# Patient Record
Sex: MALE | Race: BLACK OR AFRICAN AMERICAN | Employment: FULL TIME | RURAL
[De-identification: names, ages, dates, MRNs, and addresses within clinical notes are randomized per-mention and may not be internally consistent; named-entity substitution may affect disease eponyms.]

---

## 2017-07-12 ENCOUNTER — OFFICE VISIT (OUTPATIENT)
Dept: INTERNAL MEDICINE CLINIC | Age: 47
End: 2017-07-12

## 2017-07-12 VITALS
DIASTOLIC BLOOD PRESSURE: 88 MMHG | HEART RATE: 71 BPM | BODY MASS INDEX: 28.91 KG/M2 | SYSTOLIC BLOOD PRESSURE: 120 MMHG | HEIGHT: 69 IN | TEMPERATURE: 97.6 F | WEIGHT: 195.2 LBS | RESPIRATION RATE: 17 BRPM | OXYGEN SATURATION: 99 %

## 2017-07-12 DIAGNOSIS — R73.03 PREDIABETES: ICD-10-CM

## 2017-07-12 DIAGNOSIS — E78.5 DYSLIPIDEMIA: ICD-10-CM

## 2017-07-12 DIAGNOSIS — I10 ESSENTIAL HYPERTENSION: Primary | ICD-10-CM

## 2017-07-12 RX ORDER — SERTRALINE HYDROCHLORIDE 50 MG/1
25 TABLET, FILM COATED ORAL DAILY
Qty: 90 TAB | Refills: 1 | COMMUNITY
Start: 2017-07-12 | End: 2018-04-10 | Stop reason: SDUPTHER

## 2017-07-12 NOTE — MR AVS SNAPSHOT
Visit Information Date & Time Provider Department Dept. Phone Encounter #  
 7/12/2017  9:15 AM Edgar Peck NP [facility] [phone] [id number] Upcoming Health Maintenance Date Due INFLUENZA AGE 9 TO ADULT 8/1/2017 DTaP/Tdap/Td series (2 - Td) 7/12/2027 Allergies as of 7/12/2017  Review Complete On: 11/21/2016 By: Edgar Peck NP No Known Allergies Current Immunizations  Never Reviewed No immunizations on file. Not reviewed this visit You Were Diagnosed With   
  
 Codes Comments Essential hypertension    -  Primary ICD-10-CM: I10 
ICD-9-CM: 401.9 Dyslipidemia     ICD-10-CM: E78.5 ICD-9-CM: 272.4 Vitals BP Pulse Temp Resp Height(growth percentile) Weight(growth percentile) 120/88 (BP 1 Location: Left arm, BP Patient Position: Sitting) 71 97.6 °F (36.4 °C) (Oral) 17 5' 9\" (1.753 m) 195 lb 3.2 oz (88.5 kg) SpO2 BMI Smoking Status 99% 28.83 kg/m2 Never Smoker Vitals History BMI and BSA Data Body Mass Index Body Surface Area  
 28.83 kg/m 2 2.08 m 2 Preferred Pharmacy Pharmacy Name Phone [facility]/PHARMACY [address] [place] [address]. Char Schafer [phone] Your Updated Medication List  
  
   
This list is accurate as of: 7/12/17  9:38 AM.  Always use your most recent med list.  
  
  
  
  
 fluticasone 50 mcg/actuation nasal spray Commonly known as:  Yvonne Ty 2 Sprays by Both Nostrils route daily. losartan-hydroCHLOROthiazide 50-12.5 mg per tablet Commonly known as:  HYZAAR  
TAKE 1 TAB BY MOUTH DAILY. FOR BLOOD PRESSURE  
  
 mometasone 0.1 % ointment Commonly known as:  Maya Saupe Apply thin layer topically to affected areas  
  
 sertraline 50 mg tablet Commonly known as:  ZOLOFT  
1 tab by mouth q 3 days. tadalafil 20 mg tablet Commonly known as:  CIALIS  
TAKE 1 TABLET BY MOUTH AS NEEDED  Indications: ERECTILE DYSFUNCTION  
  
  
  
  
 We Performed the Following HEMOGLOBIN A1C WITH EAG [17385 CPT(R)] LIPID PANEL [87128 CPT(R)] METABOLIC PANEL, COMPREHENSIVE [21648 CPT(R)] Introducing \[facility]\"" & HEALTH SERVICES! Dear Dakota Brown: 
Thank you for requesting a Webcrunch account. Our records indicate that you already have an active Webcrunch account. You can access your account anytime at [email or web address] Did you know that you can access your hospital and ER discharge instructions at any time in Webcrunch? You can also review all of your test results from your hospital stay or ER visit. Additional Information If you have questions, please visit the Frequently Asked Questions section of the Webcrunch website at [email or web address] Remember, Webcrunch is NOT to be used for urgent needs. For medical emergencies, dial 911. Now available from your iPhone and Android! Please provide this summary of care documentation to your next provider. Your primary care clinician is listed as Ezio Julian. If you have any questions after today's visit, please call [phone].

## 2017-07-12 NOTE — PROGRESS NOTES
Chief Complaint   Patient presents with    Hypertension     FOLLOW UP     1. Have you been to the ER, urgent care clinic since your last visit? Hospitalized since your last visit? No    2. Have you seen or consulted any other health care providers outside of the [address] [place] [address] since your last visit? Include any pap smears or colon screening.  No     Health Maintenance Due   Topic Date Due    DTaP/Tdap/Td series (1 - Tdap) 07/19/1991

## 2017-07-13 LAB
ALBUMIN SERPL-MCNC: 4.7 G/DL (ref 3.5–5.5)
ALBUMIN/GLOB SERPL: 1.6 {RATIO} (ref 1.2–2.2)
ALP SERPL-CCNC: 95 IU/L (ref 39–117)
ALT SERPL-CCNC: 40 IU/L (ref 0–44)
AST SERPL-CCNC: 24 IU/L (ref 0–40)
BILIRUB SERPL-MCNC: 0.6 MG/DL (ref 0–1.2)
BUN SERPL-MCNC: 13 MG/DL (ref 6–24)
BUN/CREAT SERPL: 15 (ref 9–20)
CALCIUM SERPL-MCNC: 9.4 MG/DL (ref 8.7–10.2)
CHLORIDE SERPL-SCNC: 100 MMOL/L (ref 96–106)
CHOLEST SERPL-MCNC: 188 MG/DL (ref 100–199)
CO2 SERPL-SCNC: 21 MMOL/L (ref 18–29)
CREAT SERPL-MCNC: 0.87 MG/DL (ref 0.76–1.27)
EST. AVERAGE GLUCOSE BLD GHB EST-MCNC: 126 MG/DL
GLOBULIN SER CALC-MCNC: 3 G/DL (ref 1.5–4.5)
GLUCOSE SERPL-MCNC: 110 MG/DL (ref 65–99)
HBA1C MFR BLD: 6 % (ref 4.8–5.6)
HDLC SERPL-MCNC: 47 MG/DL
INTERPRETATION, 910389: NORMAL
LDLC SERPL CALC-MCNC: 122 MG/DL (ref 0–99)
POTASSIUM SERPL-SCNC: 4.2 MMOL/L (ref 3.5–5.2)
PROT SERPL-MCNC: 7.7 G/DL (ref 6–8.5)
SODIUM SERPL-SCNC: 140 MMOL/L (ref 134–144)
TRIGL SERPL-MCNC: 96 MG/DL (ref 0–149)
VLDLC SERPL CALC-MCNC: 19 MG/DL (ref 5–40)

## 2017-07-14 NOTE — PROGRESS NOTES
HISTORY OF PRESENT ILLNESS  Eugene Hernandez is a 55 y.o. male. HPI  Chief Complaint   Patient presents with    Hypertension     FOLLOW UP     BP stable today  Admits to taking BP medication as prescribed. Denies chest pain or SOB, or swelling of extremities. Admits to not following diet to lower BP. Has a 5 pound weight loss. Patient in agreement with labs due today, and check status of prediabetes. Review of Systems   Constitutional: Negative. Negative for chills, fever and malaise/fatigue. HENT: Negative for congestion and ear pain. Eyes: Negative. Respiratory: Negative. Negative for cough, shortness of breath and wheezing. Cardiovascular: Negative. Negative for chest pain and leg swelling. Gastrointestinal: Negative for abdominal pain, diarrhea, nausea and vomiting. Genitourinary: Negative. Musculoskeletal: Negative. Skin: Negative. Neurological: Negative. Negative for headaches. Psychiatric/Behavioral: Negative. Physical Exam   Constitutional: He is oriented to person, place, and time. He appears well-developed and well-nourished. No distress. HENT:   Head: Normocephalic and atraumatic. Eyes: Conjunctivae are normal.   Neck: Normal range of motion. Neck supple. Cardiovascular: Normal rate, regular rhythm and intact distal pulses. Exam reveals no gallop and no friction rub. No murmur heard. Pulmonary/Chest: Effort normal and breath sounds normal. No respiratory distress. He has no wheezes. Abdominal: Soft. He exhibits no distension. Musculoskeletal: Normal range of motion. Neurological: He is alert and oriented to person, place, and time. Skin: Skin is warm and dry. He is not diaphoretic. Psychiatric: He has a normal mood and affect. His behavior is normal. Thought content normal.   Nursing note and vitals reviewed. Plan of care and AVS reviewed with patient who verbalized understanding. ASSESSMENT and PLAN    ICD-10-CM ICD-9-CM    1. Essential hypertension P90 980.4 METABOLIC PANEL, COMPREHENSIVE      HEMOGLOBIN A1C WITH EAG      IA HANDLG&/OR CONVEY OF SPEC FOR TR OFFICE TO LAB      IA COLLECTION VENOUS BLOOD,VENIPUNCTURE   2. Dyslipidemia E78.5 272.4 LIPID PANEL      IA HANDLG&/OR CONVEY OF SPEC FOR TR OFFICE TO LAB      IA COLLECTION VENOUS BLOOD,VENIPUNCTURE   3. Prediabetes R73.03 790.29 HEMOGLOBIN A1C WITH EAG   Will notify of lab results.   F/u 6 month physical exam.

## 2017-07-21 ENCOUNTER — OFFICE VISIT (OUTPATIENT)
Dept: INTERNAL MEDICINE CLINIC | Age: 47
End: 2017-07-21

## 2017-07-21 VITALS
SYSTOLIC BLOOD PRESSURE: 136 MMHG | BODY MASS INDEX: 28.14 KG/M2 | OXYGEN SATURATION: 100 % | HEART RATE: 60 BPM | RESPIRATION RATE: 16 BRPM | DIASTOLIC BLOOD PRESSURE: 76 MMHG | TEMPERATURE: 97.2 F | WEIGHT: 190 LBS | HEIGHT: 69 IN

## 2017-07-21 DIAGNOSIS — Z02.89 ENCOUNTER FOR OCCUPATIONAL PHYSICAL EXAMINATION: Primary | ICD-10-CM

## 2017-07-21 DIAGNOSIS — I10 ESSENTIAL HYPERTENSION: ICD-10-CM

## 2017-07-21 LAB
BILIRUB UR QL STRIP: NEGATIVE
GLUCOSE UR-MCNC: NEGATIVE MG/DL
KETONES P FAST UR STRIP-MCNC: NEGATIVE MG/DL
PH UR STRIP: 7 [PH] (ref 4.6–8)
PROT UR QL STRIP: NEGATIVE MG/DL
SP GR UR STRIP: 1.02 (ref 1–1.03)
UA UROBILINOGEN AMB POC: NORMAL (ref 0.2–1)
URINALYSIS CLARITY POC: CLEAR
URINALYSIS COLOR POC: NORMAL
URINE BLOOD POC: NORMAL
URINE LEUKOCYTES POC: NEGATIVE
URINE NITRITES POC: NEGATIVE

## 2017-07-21 NOTE — MR AVS SNAPSHOT
Visit Information Date & Time Provider Department Dept. Phone Encounter #  
 7/21/2017  9:45 AM Laurence Maher MD [address] [place], [place] [address]raghu Khan [id number] Follow-up Instructions Return in about 1 year (around 7/21/2018). Your Appointments 1/8/2018  8:30 AM  
COMPLETE PHYSICAL with BRYAN Finney (SARAHI SCHEDULING) Appt Note: physical exam/labs $0pb $0co [facility]  
 [address] [place] [address]. [address] [facility] [address]  
[phone]  
  
   
 [address] [place] [address] [address] Upcoming Health Maintenance Date Due INFLUENZA AGE 9 TO ADULT 8/1/2017 DTaP/Tdap/Td series (2 - Td) 7/12/2027 Allergies as of 7/21/2017  Review Complete On: 7/21/2017 By: Laurence Maher MD  
 No Known Allergies Current Immunizations  Never Reviewed No immunizations on file. Not reviewed this visit You Were Diagnosed With   
  
 Codes Comments Encounter for occupational physical examination    -  Primary ICD-10-CM: Z02.1 ICD-9-CM: V70.5 Essential hypertension     ICD-10-CM: I10 
ICD-9-CM: 401.9 Vitals BP Pulse Temp Resp Height(growth percentile) Weight(growth percentile) 136/76 (BP 1 Location: Left arm, BP Patient Position: Sitting) 60 97.2 °F (36.2 °C) (Oral) 16 5' 9\" (1.753 m) 190 lb (86.2 kg) SpO2 BMI Smoking Status 100% 28.06 kg/m2 Never Smoker Vitals History BMI and BSA Data Body Mass Index Body Surface Area 28.06 kg/m 2 2.05 m 2 Preferred Pharmacy Pharmacy Name Phone [facility]/PHARMACY [address] [facility] [address]. [facility] [phone] Your Updated Medication List  
  
   
This list is accurate as of: 7/21/17 11:04 AM.  Always use your most recent med list.  
  
  
  
  
 fluticasone 50 mcg/actuation nasal spray Commonly known as:  Lorraine Gilberts 2 Sprays by Both Nostrils route daily. losartan-hydroCHLOROthiazide 50-12.5 mg per tablet Commonly known as:  HYZAAR  
TAKE 1 TAB BY MOUTH DAILY. FOR BLOOD PRESSURE  
  
 mometasone 0.1 % ointment Commonly known as:  Brendolyn Arbour Apply thin layer topically to affected areas  
  
 sertraline 50 mg tablet Commonly known as:  ZOLOFT  
1 tab by mouth q 3 days. tadalafil 20 mg tablet Commonly known as:  CIALIS  
TAKE 1 TABLET BY MOUTH AS NEEDED  Indications: ERECTILE DYSFUNCTION We Performed the Following AMB POC URINALYSIS DIP STICK AUTO W/O MICRO [22342 CPT(R)] Follow-up Instructions Return in about 1 year (around 7/21/2018). Introducing \[facility]\"" & HEALTH SERVICES! Dear Salena Koo: 
Thank you for requesting a DigitalPost Interactive account. Our records indicate that you already have an active DigitalPost Interactive account. You can access your account anytime at [email or web address] Did you know that you can access your hospital and ER discharge instructions at any time in DigitalPost Interactive? You can also review all of your test results from your hospital stay or ER visit. Additional Information If you have questions, please visit the Frequently Asked Questions section of the DigitalPost Interactive website at [email or web address] Remember, DigitalPost Interactive is NOT to be used for urgent needs. For medical emergencies, dial 911. Now available from your iPhone and Android! Please provide this summary of care documentation to your next provider. Your primary care clinician is listed as Jon Maher. If you have any questions after today's visit, please call [phone].

## 2017-07-21 NOTE — PROGRESS NOTES
Chief Complaint   Patient presents with    Physical     DOT     I have reviewed the patient's medical history in detail and updated the computerized patient record. Health Maintenance reviewed. 1. Have you been to the ER, urgent care clinic since your last visit? Hospitalized since your last visit?no    2. Have you seen or consulted any other health care providers outside of the [address] [place] [address] since your last visit? Include any pap smears or colon screening. No    Do you have an [address] in place in the event that you have a healthcare crisis that could impact your decision making as it pertains to your health?no  Would you like information about the [address] [place] [address] given. no Intact

## 2017-07-21 NOTE — PROGRESS NOTES
DOT PHYSICAL    Jessica Estrada 52 y.o. presents for a DOT physical.  He has the following concerns: none feels well    No dizziness, syncope or fainting, exertional chest pain, excessive shortness of breath, focal weakness, abdominal pain, severe depressed mood, thoughts of suicide, recreational drug abuse, excessive alcohol usage. No excessive sleepiness in the day time falling asleep at the wheel or any motor vehicle accidents. No diabetes. No insulin or narcotic agents. Patient Active Problem List   Diagnosis Code    Hypertension I10    Atopic dermatitis L20.9    Erectile dysfunction N52.9    Dyslipidemia E78.5    Hypertriglyceridemia E78.1    Advanced care planning/counseling discussion Z71.89    Prediabetes R73.03     Past Medical History:   Diagnosis Date    ED (erectile dysfunction)     Hypertension      History reviewed. No pertinent surgical history. Social History     Social History    Marital status:      Spouse name: N/A    Number of children: N/A    Years of education: N/A     Occupational History    Not on file. Social History Main Topics    Smoking status: Never Smoker    Smokeless tobacco: Never Used    Alcohol use No    Drug use: No    Sexual activity: Yes     Partners: Female     Other Topics Concern    Not on file     Social History Narrative       Current Outpatient Prescriptions   Medication Sig Dispense Refill    sertraline (ZOLOFT) 50 mg tablet 1 tab by mouth q 3 days. 90 Tab 1    losartan-hydrochlorothiazide (HYZAAR) 50-12.5 mg per tablet TAKE 1 TAB BY MOUTH DAILY. FOR BLOOD PRESSURE 90 Tab 1    fluticasone (FLONASE) 50 mcg/actuation nasal spray 2 Sprays by Both Nostrils route daily.  1 Bottle 5    tadalafil (CIALIS) 20 mg tablet TAKE 1 TABLET BY MOUTH AS NEEDED  Indications: ERECTILE DYSFUNCTION 5 Tab 5    mometasone (ELOCON) 0.1 % ointment Apply thin layer topically to affected areas 45 g 2         Results for orders placed or performed in visit on 63/96/24   METABOLIC PANEL, COMPREHENSIVE   Result Value Ref Range    Glucose 110 (H) 65 - 99 mg/dL    BUN 13 6 - 24 mg/dL    Creatinine 0.87 0.76 - 1.27 mg/dL    GFR est non- >59 mL/min/1.73    GFR est  >59 mL/min/1.73    BUN/Creatinine ratio 15 9 - 20    Sodium 140 134 - 144 mmol/L    Potassium 4.2 3.5 - 5.2 mmol/L    Chloride 100 96 - 106 mmol/L    CO2 21 18 - 29 mmol/L    Calcium 9.4 8.7 - 10.2 mg/dL    Protein, total 7.7 6.0 - 8.5 g/dL    Albumin 4.7 3.5 - 5.5 g/dL    GLOBULIN, TOTAL 3.0 1.5 - 4.5 g/dL    A-G Ratio 1.6 1.2 - 2.2    Bilirubin, total 0.6 0.0 - 1.2 mg/dL    Alk. phosphatase 95 39 - 117 IU/L    AST (SGOT) 24 0 - 40 IU/L    ALT (SGPT) 40 0 - 44 IU/L   HEMOGLOBIN A1C WITH EAG   Result Value Ref Range    Hemoglobin A1c 6.0 (H) 4.8 - 5.6 %    Estimated average glucose 126 mg/dL   LIPID PANEL   Result Value Ref Range    Cholesterol, total 188 100 - 199 mg/dL    Triglyceride 96 0 - 149 mg/dL    HDL Cholesterol 47 >39 mg/dL    VLDL, calculated 19 5 - 40 mg/dL    LDL, calculated 122 (H) 0 - 99 mg/dL   CVD REPORT   Result Value Ref Range    INTERPRETATION Note          ROS: 12 point system revived,please see HPI for pertinent positives. OBJECTIVE:     Visit Vitals    /76 (BP 1 Location: Left arm, BP Patient Position: Sitting)    Pulse 60    Temp 97.2 °F (36.2 °C) (Oral)    Resp 16    Ht 5' 9\" (1.753 m)    Wt 190 lb (86.2 kg)    SpO2 100%    BMI 28.06 kg/m2       Vision meet standards and hearing test good. No exam data present       Physical Examination:    General appearance - alert, well appearing, and in no distress. HEENT  PERRLA, EOMI, Sclera clear, anicteric, Oropharynx clear, no lesions. Chest - RRR S1, S2 heard, no peripheral edema. Lungs- Clear to auscultation, no wheezes, rales or rhonchi, has symmetric air entry    Neck- Supple, No adenopathy.     Neuro- CN 2 to 11 grossly normal, No neuro deficits DTR 2/4, strength on upper/lower ext 5/5 gustavo    Abdomen/groin- ND,NT, No hernia    ROM: Good ROM of upper and lower extremities. Feet normal      Psy- Mood and Affect WNL      ASSESSMENT/PLAN         Healthy appearing person. Advised routine care with PCP. 1 yr DOT card with no restrictions given. DOT papers kept in file and [facility] web site updated. I have discussed the diagnosis with the patient and the intended plan as seen in the above orders. The patient has received an after-visit summary and questions were answered concerning future plans. I have discussed medication side effects and warnings with the patient as well. Pt verbalizes understanding.

## 2017-10-04 NOTE — TELEPHONE ENCOUNTER
Requested Prescriptions     Pending Prescriptions Disp Refills    losartan-hydroCHLOROthiazide (HYZAAR) 50-12.5 mg per tablet 90 Tab 1

## 2017-10-05 ENCOUNTER — DOCUMENTATION ONLY (OUTPATIENT)
Dept: INTERNAL MEDICINE CLINIC | Age: 47
End: 2017-10-05

## 2017-10-05 RX ORDER — LOSARTAN POTASSIUM AND HYDROCHLOROTHIAZIDE 12.5; 5 MG/1; MG/1
TABLET ORAL
Qty: 90 TAB | Refills: 1 | OUTPATIENT
Start: 2017-10-05

## 2017-10-05 RX ORDER — LOSARTAN POTASSIUM AND HYDROCHLOROTHIAZIDE 12.5; 5 MG/1; MG/1
1 TABLET ORAL DAILY
Qty: 90 TAB | Refills: 1 | Status: SHIPPED | OUTPATIENT
Start: 2017-10-05 | End: 2018-04-12 | Stop reason: SDUPTHER

## 2017-10-05 NOTE — TELEPHONE ENCOUNTER
Last office visit:  7/21/17  Last filled:  Hyzaar 6/19/16 #90 X 1 refill  No changes  Follow up in 3 months with Justine Bhandari NP

## 2017-10-05 NOTE — PROGRESS NOTES
Pt visited practice was calling in ref to his refill req stated that he sent a message via my chart and he was waiting for an response pt was adv of 24/48 turn around time,ngozi randall in ref to pts prescription

## 2018-01-08 ENCOUNTER — OFFICE VISIT (OUTPATIENT)
Dept: INTERNAL MEDICINE CLINIC | Age: 48
End: 2018-01-08

## 2018-01-08 VITALS
OXYGEN SATURATION: 99 % | BODY MASS INDEX: 29.38 KG/M2 | TEMPERATURE: 97.2 F | HEART RATE: 57 BPM | WEIGHT: 198.4 LBS | RESPIRATION RATE: 16 BRPM | SYSTOLIC BLOOD PRESSURE: 119 MMHG | HEIGHT: 69 IN | DIASTOLIC BLOOD PRESSURE: 74 MMHG

## 2018-01-08 DIAGNOSIS — R04.0 BLEEDING NOSE: ICD-10-CM

## 2018-01-08 DIAGNOSIS — Z00.00 PHYSICAL EXAM, ANNUAL: Primary | ICD-10-CM

## 2018-01-08 DIAGNOSIS — E78.5 DYSLIPIDEMIA: ICD-10-CM

## 2018-01-08 DIAGNOSIS — R73.03 PREDIABETES: ICD-10-CM

## 2018-01-08 LAB
BILIRUB UR QL STRIP: NEGATIVE
GLUCOSE UR-MCNC: NEGATIVE MG/DL
KETONES P FAST UR STRIP-MCNC: NEGATIVE MG/DL
PH UR STRIP: 5.5 [PH] (ref 4.6–8)
PROT UR QL STRIP: NEGATIVE
SP GR UR STRIP: 1.02 (ref 1–1.03)
UA UROBILINOGEN AMB POC: NORMAL (ref 0.2–1)
URINALYSIS CLARITY POC: CLEAR
URINALYSIS COLOR POC: YELLOW
URINE BLOOD POC: NORMAL
URINE LEUKOCYTES POC: NEGATIVE
URINE NITRITES POC: NEGATIVE

## 2018-01-08 NOTE — MR AVS SNAPSHOT
Visit Information Date & Time Provider Department Dept. Phone Encounter #  
 1/8/2018  8:30 AM Brandon Crooks NP [facility] [phone] Follow-up Instructions Return in about 6 months (around 7/8/2018) for HTN. Upcoming Health Maintenance Date Due Influenza Age 5 to Adult 8/1/2017 DTaP/Tdap/Td series (2 - Td) 7/12/2027 Allergies as of 1/8/2018  Review Complete On: 1/8/2018 By: Brandon Crooks NP No Known Allergies Current Immunizations  Never Reviewed No immunizations on file. Not reviewed this visit You Were Diagnosed With   
  
 Codes Comments Physical exam, annual    -  Primary ICD-10-CM: Z00.00 ICD-9-CM: V70.0 Prediabetes     ICD-10-CM: R73.03 
ICD-9-CM: 790.29 Dyslipidemia     ICD-10-CM: E78.5 ICD-9-CM: 272.4 Bleeding nose     ICD-10-CM: R04.0 ICD-9-CM: 030. 7 Vitals BP Pulse Temp Resp Height(growth percentile) 119/74 (BP 1 Location: Left arm, BP Patient Position: Sitting) (!) 57 97.2 °F (36.2 °C) (Temporal) 16 5' 9\" (1.753 m) Weight(growth percentile) SpO2 BMI Smoking Status 198 lb 6.4 oz (90 kg) 99% 29.3 kg/m2 Never Smoker BMI and BSA Data Body Mass Index Body Surface Area  
 29.3 kg/m 2 2.09 m 2 Preferred Pharmacy Pharmacy Name Phone [facility] [address] Janette Beltrán [phone] Your Updated Medication List  
  
   
This list is accurate as of: 1/8/18  8:54 AM.  Always use your most recent med list.  
  
  
  
  
 fluticasone 50 mcg/actuation nasal spray Commonly known as:  Memory Lust 2 Sprays by Both Nostrils route daily. losartan-hydroCHLOROthiazide 50-12.5 mg per tablet Commonly known as:  HYZAAR Take 1 Tab by mouth daily. mometasone 0.1 % ointment Commonly known as:  Margrette Cools Apply thin layer topically to affected areas  
  
 sertraline 50 mg tablet Commonly known as:  ZOLOFT  
 25 mg daily. 1 tab by mouth q 3 days. tadalafil 20 mg tablet Commonly known as:  CIALIS  
TAKE 1 TABLET BY MOUTH AS NEEDED  Indications: ERECTILE DYSFUNCTION We Performed the Following AMB POC URINALYSIS DIP STICK MANUAL W/O MICRO [68666 CPT(R)] HEMOGLOBIN A1C WITH EAG [79268 CPT(R)] LIPID PANEL [63183 CPT(R)] METABOLIC PANEL, COMPREHENSIVE [81470 CPT(R)] PTT [id number] CPT(R)] Follow-up Instructions Return in about 6 months (around 7/8/2018) for HTN. Introducing [facility] SERVICES! Dear Lupillo Kiran: 
Thank you for requesting a IMRSV account. Our records indicate that you already have an active IMRSV account. You can access your account anytime at [email or web address] Did you know that you can access your hospital and ER discharge instructions at any time in IMRSV? You can also review all of your test results from your hospital stay or ER visit. Additional Information If you have questions, please visit the Frequently Asked Questions section of the IMRSV website at [email or web address] Remember, IMRSV is NOT to be used for urgent needs. For medical emergencies, dial 911. Now available from your iPhone and Android! Please provide this summary of care documentation to your next provider. Your primary care clinician is listed as Paulette Alvarado. If you have any questions after today's visit, please call [phone].

## 2018-01-08 NOTE — PROGRESS NOTES
Chief Complaint   Patient presents with    Complete Physical     1. Have you been to the ER, urgent care clinic since your last visit? Hospitalized since your last visit? No    2. Have you seen or consulted any other health care providers outside of the [address] [place] [address] since your last visit? Include any pap smears or colon screening. No     Health Maintenance Due   Topic Date Due    Influenza Age 5 to Adult  08/01/2017     Patient denies flu vaccine.

## 2018-01-08 NOTE — PROGRESS NOTES
HISTORY OF PRESENT ILLNESS  Dhaval Paulson is a 52 y.o. male. HPI  Chief Complaint   Patient presents with    Complete Physical     States has been having nose bleed x 6 months. Denies trauma  States after blood clot forms bleeding stops. BP stable today  Admits to taking BP medication as prescribed. Denies chest pain or SOB, or swelling of extremities. Admits to not following diet to lower BP. Review of Systems   Constitutional: Negative for chills, fever and malaise/fatigue. HENT: Negative. Negative for ear pain and sinus pain. Eyes: Negative. Respiratory: Negative. Negative for cough, shortness of breath and wheezing. Cardiovascular: Negative. Negative for chest pain and leg swelling. Gastrointestinal: Negative for abdominal pain, constipation, diarrhea, nausea and vomiting. Genitourinary: Negative. Musculoskeletal: Negative. Skin: Negative. Neurological: Negative. Physical Exam   Constitutional: He is oriented to person, place, and time. He appears well-developed and well-nourished. No distress. HENT:   Head: Normocephalic and atraumatic. Eyes: Conjunctivae are normal.   Cardiovascular: Normal rate, regular rhythm, normal heart sounds and intact distal pulses. Exam reveals no gallop and no friction rub. No murmur heard. Pulmonary/Chest: Effort normal and breath sounds normal. No respiratory distress. He has no wheezes. He has no rales. Abdominal: Soft. Musculoskeletal: Normal range of motion. Neurological: He is alert and oriented to person, place, and time. Skin: Skin is warm and dry. He is not diaphoretic. Psychiatric: He has a normal mood and affect. His behavior is normal. Thought content normal.   Nursing note and vitals reviewed. Plan of care and AVS reviewed with patient who verbalized understanding. ASSESSMENT and PLAN    ICD-10-CM ICD-9-CM    1. Physical exam, annual Z00.00 V70.0 AMB POC URINALYSIS DIP STICK MANUAL W/O MICRO   2. Prediabetes A86.24 830.99 METABOLIC PANEL, COMPREHENSIVE      HEMOGLOBIN A1C WITH EAG   3. Dyslipidemia E78.5 272.4 LIPID PANEL   4. Bleeding nose R04.0 784.7 PTT   Patient agreed to labs due. Will notify via my chart. Discussed referral to ENT for nose bleed  Patient states will think about it and let me know. F/u 6 months HTN. Discussed the patient's BMI with him. The BMI follow up plan is as follows:     dietary management education, guidance, and counseling  encourage exercise  monitor weight  prescribed dietary intake    An After Visit Summary was printed and given to the patient.

## 2018-01-09 LAB
ALBUMIN SERPL-MCNC: 4.4 G/DL (ref 3.5–5.5)
ALBUMIN/GLOB SERPL: 1.7 {RATIO} (ref 1.2–2.2)
ALP SERPL-CCNC: 95 IU/L (ref 39–117)
ALT SERPL-CCNC: 39 IU/L (ref 0–44)
APTT PPP: NORMAL SEC
AST SERPL-CCNC: 29 IU/L (ref 0–40)
BILIRUB SERPL-MCNC: 0.5 MG/DL (ref 0–1.2)
BUN SERPL-MCNC: 14 MG/DL (ref 6–24)
BUN/CREAT SERPL: 16 (ref 9–20)
CALCIUM SERPL-MCNC: 8.8 MG/DL (ref 8.7–10.2)
CHLORIDE SERPL-SCNC: 100 MMOL/L (ref 96–106)
CHOLEST SERPL-MCNC: 183 MG/DL (ref 100–199)
CO2 SERPL-SCNC: 21 MMOL/L (ref 18–29)
CREAT SERPL-MCNC: 0.89 MG/DL (ref 0.76–1.27)
EST. AVERAGE GLUCOSE BLD GHB EST-MCNC: 120 MG/DL
GLOBULIN SER CALC-MCNC: 2.6 G/DL (ref 1.5–4.5)
GLUCOSE SERPL-MCNC: 102 MG/DL (ref 65–99)
HBA1C MFR BLD: 5.8 % (ref 4.8–5.6)
HDLC SERPL-MCNC: 41 MG/DL
INTERPRETATION, 910389: NORMAL
LDLC SERPL CALC-MCNC: 119 MG/DL (ref 0–99)
POTASSIUM SERPL-SCNC: 3.8 MMOL/L (ref 3.5–5.2)
PROT SERPL-MCNC: 7 G/DL (ref 6–8.5)
SODIUM SERPL-SCNC: 140 MMOL/L (ref 134–144)
TRIGL SERPL-MCNC: 114 MG/DL (ref 0–149)
VLDLC SERPL CALC-MCNC: 23 MG/DL (ref 5–40)

## 2018-04-10 RX ORDER — SERTRALINE HYDROCHLORIDE 50 MG/1
TABLET, FILM COATED ORAL
Qty: 90 TAB | Refills: 1 | Status: SHIPPED | OUTPATIENT
Start: 2018-04-10 | End: 2018-09-18 | Stop reason: SDUPTHER

## 2018-04-10 NOTE — TELEPHONE ENCOUNTER
Last office visit:  1/8/18  Last filled:  Zoloft 7/12/17 #90 X 1 refill  No changes  No follow up scheduled

## 2018-04-12 RX ORDER — LOSARTAN POTASSIUM AND HYDROCHLOROTHIAZIDE 12.5; 5 MG/1; MG/1
TABLET ORAL
Qty: 90 TAB | Refills: 0 | Status: SHIPPED | OUTPATIENT
Start: 2018-04-12 | End: 2018-07-19 | Stop reason: SDUPTHER

## 2018-04-12 NOTE — TELEPHONE ENCOUNTER
Last office visit:  1/8/18  Last filled:  Hyzaar 10/5/17 #90 X 1 refill  No changes  No follow up scheduled

## 2018-07-20 RX ORDER — LOSARTAN POTASSIUM AND HYDROCHLOROTHIAZIDE 12.5; 5 MG/1; MG/1
TABLET ORAL
Qty: 90 TAB | Refills: 0 | Status: SHIPPED | OUTPATIENT
Start: 2018-07-20 | End: 2018-09-18 | Stop reason: SDUPTHER

## 2018-09-18 ENCOUNTER — OFFICE VISIT (OUTPATIENT)
Dept: INTERNAL MEDICINE CLINIC | Age: 48
End: 2018-09-18

## 2018-09-18 VITALS
TEMPERATURE: 97.5 F | RESPIRATION RATE: 16 BRPM | DIASTOLIC BLOOD PRESSURE: 87 MMHG | HEIGHT: 69 IN | WEIGHT: 198 LBS | SYSTOLIC BLOOD PRESSURE: 137 MMHG | HEART RATE: 67 BPM | OXYGEN SATURATION: 100 % | BODY MASS INDEX: 29.33 KG/M2

## 2018-09-18 DIAGNOSIS — I10 ESSENTIAL HYPERTENSION: Primary | ICD-10-CM

## 2018-09-18 DIAGNOSIS — F32.0 CURRENT MILD EPISODE OF MAJOR DEPRESSIVE DISORDER WITHOUT PRIOR EPISODE (HCC): ICD-10-CM

## 2018-09-18 DIAGNOSIS — R21 RASH: ICD-10-CM

## 2018-09-18 DIAGNOSIS — L30.9 ECZEMA, UNSPECIFIED TYPE: ICD-10-CM

## 2018-09-18 DIAGNOSIS — N52.9 ERECTILE DYSFUNCTION, UNSPECIFIED ERECTILE DYSFUNCTION TYPE: ICD-10-CM

## 2018-09-18 RX ORDER — SERTRALINE HYDROCHLORIDE 50 MG/1
TABLET, FILM COATED ORAL
Qty: 90 TAB | Refills: 3 | Status: SHIPPED | OUTPATIENT
Start: 2018-09-18 | End: 2019-06-06 | Stop reason: SDUPTHER

## 2018-09-18 RX ORDER — SILDENAFIL 50 MG/1
50 TABLET, FILM COATED ORAL AS NEEDED
Qty: 5 TAB | Refills: 5 | Status: SHIPPED | OUTPATIENT
Start: 2018-09-18 | End: 2019-06-06 | Stop reason: SDUPTHER

## 2018-09-18 RX ORDER — MOMETASONE FUROATE 1 MG/G
OINTMENT TOPICAL
Qty: 45 G | Refills: 1 | Status: SHIPPED | OUTPATIENT
Start: 2018-09-18 | End: 2021-07-28 | Stop reason: SDUPTHER

## 2018-09-18 RX ORDER — LOSARTAN POTASSIUM AND HYDROCHLOROTHIAZIDE 12.5; 5 MG/1; MG/1
TABLET ORAL
Qty: 90 TAB | Refills: 3 | Status: SHIPPED | OUTPATIENT
Start: 2018-09-18 | End: 2019-03-18 | Stop reason: SDUPTHER

## 2018-09-18 NOTE — PROGRESS NOTES
Chief Complaint Patient presents with  Hypertension  
  follow up I have reviewed the patient's medical history in detail and updated the computerized patient record. Health Maintenance reviewed. 1. Have you been to the ER, urgent care clinic since your last visit? Hospitalized since your last visit?no 2. Have you seen or consulted any other health care providers outside of the [address] [place] [address] Juan Manuel since your last visit? Include any pap smears or colon screening. No 
 
Encouraged pt to discuss pt's wishes with spouse/partner/family and bring them in the next appt to follow thru with the Advanced Directive Fall Risk Assessment, last 12 mths 5/31/2016 Able to walk? Yes Fall in past 12 months? No  
 
 
PHQ over the last two weeks 9/18/2018 Little interest or pleasure in doing things Several days Feeling down, depressed, irritable, or hopeless Several days Total Score PHQ 2 2 Abuse Screening Questionnaire 1/8/2018 Do you ever feel afraid of your partner? Lianna Shepard Are you in a relationship with someone who physically or mentally threatens you? Lianna Shepard Is it safe for you to go home? Y  
 
 

## 2018-09-18 NOTE — PROGRESS NOTES
Subjective:  
 
Harpal Turner is a 50 y.o. male who presents for follow up of hypertension. New concerns: occa breathing issues, not severe but notices when exercises more. ins issues not covering  cialis Occasional steroid cream when his eczema acts up. Doing okay. Current Outpatient Prescriptions Medication Sig Dispense Refill  losartan-hydroCHLOROthiazide (HYZAAR) 50-12.5 mg per tablet TAKE 1 TABLET BY MOUTH EVERY DAY 90 Tab 0  
 sertraline (ZOLOFT) 50 mg tablet TAKE 1 TABLET EVERY DAY 90 Tab 1  
 fluticasone (FLONASE) 50 mcg/actuation nasal spray 2 Sprays by Both Nostrils route daily. 1 Bottle 5  
 tadalafil (CIALIS) 20 mg tablet TAKE 1 TABLET BY MOUTH AS NEEDED  Indications: ERECTILE DYSFUNCTION 5 Tab 5  
 mometasone (ELOCON) 0.1 % ointment Apply thin layer topically to affected areas 45 g 2 No Known Allergies Diet and Lifestyle: nonsmoker Cardiovascular ROS: taking medications as instructed, no medication side effects noted, no TIA's, no chest pain on exertion, notes stable dyspnea on exertion, no change, no swelling of ankles. Review of Systems, additional: 
Pertinent items are noted in HPI. Patient Active Problem List  
 Diagnosis Date Noted  Prediabetes 05/17/2016  Advanced care planning/counseling discussion 11/30/2015  Hypertension 06/28/2012  Atopic dermatitis 06/28/2012  Erectile dysfunction 06/28/2012  Dyslipidemia 06/28/2012  Hypertriglyceridemia 06/28/2012 Current Outpatient Prescriptions Medication Sig Dispense Refill  losartan-hydroCHLOROthiazide (HYZAAR) 50-12.5 mg per tablet TAKE 1 TABLET BY MOUTH EVERY DAY 90 Tab 3  
 sertraline (ZOLOFT) 50 mg tablet TAKE 1 TABLET EVERY DAY 90 Tab 3  
 mometasone (ELOCON) 0.1 % ointment Apply thin layer topically to affected areas 45 g 1  
 sildenafil citrate (VIAGRA) 50 mg tablet Take 1 Tab by mouth as needed.  5 Tab 5  
 fluticasone (FLONASE) 50 mcg/actuation nasal spray 2 Sprays by Both Nostrils route daily. 1 Bottle 5 No Known Allergies Social History Substance Use Topics  Smoking status: Never Smoker  Smokeless tobacco: Never Used  Alcohol use No  
  
 
Lab Results Component Value Date/Time Hemoglobin A1c 5.8 (H) 01/08/2018 08:59 AM  
Hemoglobin A1c 6.0 (H) 07/12/2017 09:39 AM  
Hemoglobin A1c 6.0 (H) 11/21/2016 11:32 AM  
Glucose 102 (H) 01/08/2018 08:59 AM  
Microalb/Creat ratio (ug/mg creat.) 4.1 11/05/2014 08:22 AM  
LDL, calculated 119 (H) 01/08/2018 08:59 AM  
Creatinine 0.89 01/08/2018 08:59 AM  
  
Lab Results Component Value Date/Time Cholesterol, total 183 01/08/2018 08:59 AM  
HDL Cholesterol 41 01/08/2018 08:59 AM  
LDL, calculated 119 (H) 01/08/2018 08:59 AM  
Triglyceride 114 01/08/2018 08:59 AM  
CHOL/HDL Ratio 4.5 11/11/2010 12:19 PM  
 
Lab Results Component Value Date/Time GFR est non- 01/08/2018 08:59 AM  
GFR est  01/08/2018 08:59 AM  
Creatinine 0.89 01/08/2018 08:59 AM  
BUN 14 01/08/2018 08:59 AM  
Sodium 140 01/08/2018 08:59 AM  
Potassium 3.8 01/08/2018 08:59 AM  
Chloride 100 01/08/2018 08:59 AM  
CO2 21 01/08/2018 08:59 AM  
 
Lab Results Component Value Date/Time Glucose 102 (H) 01/08/2018 08:59 AM  
   
 
 
 
Objective:  
 
Physical exam significant for the following: WNL Visit Vitals  /87 (BP 1 Location: Left arm, BP Patient Position: Sitting)  Pulse 67  Temp 97.5 °F (36.4 °C) (Oral)  Resp 16  
 Ht 5' 9\" (1.753 m)  Wt 198 lb (89.8 kg)  SpO2 100%  BMI 29.24 kg/m2 Appearance: alert, well appearing, and in no distress. General exam: [facility] exam BP noted to be well controlled today in office, S1, S2 normal, no gallop, no murmur, chest clear, no JVD, no HSM, no edema. .  
Assessment/Plan:  
 
hypertension stable, hyperlipidemia stable. ED, Viagra now generic he can get it at a lower price.  
Depression consider weaning off Zoloft which might help improve his ED, for now wants to stay on that medication. Moods been stable. ICD-10-CM ICD-9-CM 1. Essential hypertension I10 401.9 2. Rash R21 782.1 mometasone (ELOCON) 0.1 % ointment 3. Eczema, unspecified type L30.9 692.9 4. Erectile dysfunction, unspecified erectile dysfunction type N52.9 607.84   
5. Current mild episode of major depressive disorder without prior episode ([facility]) F32.0 296.21 Orders Placed This Encounter  losartan-hydroCHLOROthiazide (HYZAAR) 50-12.5 mg per tablet Sig: TAKE 1 TABLET BY MOUTH EVERY DAY Dispense:  90 Tab Refill:  3  
 sertraline (ZOLOFT) 50 mg tablet Sig: TAKE 1 TABLET EVERY DAY Dispense:  90 Tab Refill:  3  
 mometasone (ELOCON) 0.1 % ointment Sig: Apply thin layer topically to affected areas Dispense:  45 g Refill:  1  sildenafil citrate (VIAGRA) 50 mg tablet Sig: Take 1 Tab by mouth as needed. Dispense:  5 Tab Refill:  5 Follow-up Disposition: 
Return in about 6 months (around 3/18/2019) for full physical.

## 2018-09-27 ENCOUNTER — TELEPHONE (OUTPATIENT)
Dept: INTERNAL MEDICINE CLINIC | Age: 48
End: 2018-09-27

## 2018-09-27 NOTE — TELEPHONE ENCOUNTER
Received notice from [facility] - PA request for Sildenafil has been approved from 9/27/18 - 9/27/2021  Mónica Zimmerman LPN  [id number]  1:84 PM

## 2018-09-27 NOTE — TELEPHONE ENCOUNTER
9/27/18 Called 9  PA initiated with PA rep CARI Cabrera rep for Slidenafil Citrate 50 mg,approval given for 36 month,effective 9/27/18 thru 9/27/21. [facility] pharmacy called spoke to pharmacy staff Kinza,stated ,ok I will go ahead and process it and get it ready.

## 2019-03-18 NOTE — TELEPHONE ENCOUNTER
Requested Prescriptions     Pending Prescriptions Disp Refills    losartan-hydroCHLOROthiazide (HYZAAR) 50-12.5 mg per tablet 90 Tab 3     Sig: TAKE 1 TABLET BY MOUTH EVERY DAY

## 2019-03-19 RX ORDER — LOSARTAN POTASSIUM AND HYDROCHLOROTHIAZIDE 12.5; 5 MG/1; MG/1
TABLET ORAL
Qty: 90 TAB | Refills: 3 | Status: SHIPPED | OUTPATIENT
Start: 2019-03-19 | End: 2019-03-21 | Stop reason: RX

## 2019-03-19 NOTE — TELEPHONE ENCOUNTER
Requested Prescriptions     Pending Prescriptions Disp Refills    losartan-hydroCHLOROthiazide (HYZAAR) 50-12.5 mg per tablet 90 Tab 3     Sig: TAKE 1 TABLET BY MOUTH EVERY DAY     Future Appointments:  No future appointments.    Last Appointment With Me:  09.18.2018

## 2019-03-21 ENCOUNTER — TELEPHONE (OUTPATIENT)
Dept: INTERNAL MEDICINE CLINIC | Age: 49
End: 2019-03-21

## 2019-03-21 RX ORDER — LOSARTAN POTASSIUM AND HYDROCHLOROTHIAZIDE 12.5; 5 MG/1; MG/1
1 TABLET ORAL DAILY
Qty: 90 TAB | Refills: 3 | Status: SHIPPED | OUTPATIENT
Start: 2019-03-21 | End: 2020-05-13

## 2019-03-21 RX ORDER — OLMESARTAN MEDOXOMIL AND HYDROCHLOROTHIAZIDE 20/12.5 20; 12.5 MG/1; MG/1
1 TABLET ORAL DAILY
Qty: 30 TAB | Refills: 5 | Status: SHIPPED | OUTPATIENT
Start: 2019-03-21 | End: 2019-03-21 | Stop reason: ALTCHOICE

## 2019-03-21 NOTE — TELEPHONE ENCOUNTER
VITORI - Called pt of med change and his pharmacy changed his losartan with a new batch not on recall

## 2019-06-06 ENCOUNTER — OFFICE VISIT (OUTPATIENT)
Dept: INTERNAL MEDICINE CLINIC | Age: 49
End: 2019-06-06

## 2019-06-06 VITALS
HEIGHT: 69 IN | TEMPERATURE: 98.1 F | WEIGHT: 196 LBS | HEART RATE: 62 BPM | BODY MASS INDEX: 29.03 KG/M2 | RESPIRATION RATE: 16 BRPM | OXYGEN SATURATION: 98 % | DIASTOLIC BLOOD PRESSURE: 80 MMHG | SYSTOLIC BLOOD PRESSURE: 128 MMHG

## 2019-06-06 DIAGNOSIS — L20.9 ATOPIC DERMATITIS, UNSPECIFIED TYPE: ICD-10-CM

## 2019-06-06 DIAGNOSIS — F32.0 MILD MAJOR DEPRESSION, SINGLE EPISODE (HCC): ICD-10-CM

## 2019-06-06 DIAGNOSIS — Z00.00 ROUTINE MEDICAL EXAM: Primary | ICD-10-CM

## 2019-06-06 DIAGNOSIS — I10 ESSENTIAL HYPERTENSION: ICD-10-CM

## 2019-06-06 DIAGNOSIS — Z23 ENCOUNTER FOR IMMUNIZATION: ICD-10-CM

## 2019-06-06 RX ORDER — SERTRALINE HYDROCHLORIDE 50 MG/1
TABLET, FILM COATED ORAL
Qty: 90 TAB | Refills: 3 | Status: SHIPPED | OUTPATIENT
Start: 2019-06-06 | End: 2020-06-16

## 2019-06-06 RX ORDER — SILDENAFIL 50 MG/1
50 TABLET, FILM COATED ORAL AS NEEDED
Qty: 5 TAB | Refills: 5 | Status: SHIPPED | OUTPATIENT
Start: 2019-06-06 | End: 2019-09-12 | Stop reason: SDUPTHER

## 2019-06-06 NOTE — PROGRESS NOTES
Chief Complaint   Patient presents with    Physical     I have reviewed the patient's medical history in detail and updated the computerized patient record. Health Maintenance reviewed. 1. Have you been to the ER, urgent care clinic since your last visit? Hospitalized since your last visit?no    2. Have you seen or consulted any other health care providers outside of the [address] [place] [address] Juan Manuel since your last visit? Include any pap smears or colon screening. No      Encouraged pt to discuss pt's wishes with spouse/partner/family and bring them in the next appt to follow thru with the Advanced Directive    Fall Risk Assessment, last 12 mths 6/6/2019   Able to walk? Yes   Fall in past 12 months? No       3 most recent PHQ Screens 6/6/2019   Little interest or pleasure in doing things Several days   Feeling down, depressed, irritable, or hopeless Several days   Total Score PHQ 2 2       Abuse Screening Questionnaire 6/6/2019   Do you ever feel afraid of your partner? N   Are you in a relationship with someone who physically or mentally threatens you? N   Is it safe for you to go home?  Y       ADL Assessment 6/6/2019   Feeding yourself No Help Needed   Getting from bed to chair No Help Needed   Getting dressed No Help Needed   Bathing or showering No Help Needed   Walk across the room (includes cane/walker) No Help Needed   Using the telphone No Help Needed   Taking your medications No Help Needed   Preparing meals No Help Needed   Managing money (expenses/bills) No Help Needed   Moderately strenuous housework (laundry) No Help Needed   Shopping for personal items (toiletries/medicines) No Help Needed   Shopping for groceries No Help Needed   Driving No Help Needed   Climbing a flight of stairs No Help Needed   Getting to places beyond walking distances No Help Needed

## 2019-06-06 NOTE — PROGRESS NOTES
Subjective:   Mine Sullivan is a 50 y.o. male presenting for his annual checkup. ROS:  Feeling well. No dyspnea or chest pain on exertion. No abdominal pain, change in bowel habits, black or bloody stools. No urinary tract or prostatic symptoms. No neurological complaints. Specific concerns today: occa epistaxis it resolves. Patient Active Problem List    Diagnosis Date Noted    Prediabetes 05/17/2016    Advanced care planning/counseling discussion 11/30/2015    Hypertension 06/28/2012    Atopic dermatitis 06/28/2012    Erectile dysfunction 06/28/2012    Dyslipidemia 06/28/2012    Hypertriglyceridemia 06/28/2012     Current Outpatient Medications   Medication Sig Dispense Refill    losartan-hydroCHLOROthiazide (HYZAAR) 50-12.5 mg per tablet Take 1 Tab by mouth daily. 90 Tab 3    sertraline (ZOLOFT) 50 mg tablet TAKE 1 TABLET EVERY DAY 90 Tab 3    mometasone (ELOCON) 0.1 % ointment Apply thin layer topically to affected areas 45 g 1    sildenafil citrate (VIAGRA) 50 mg tablet Take 1 Tab by mouth as needed. 5 Tab 5    fluticasone (FLONASE) 50 mcg/actuation nasal spray 2 Sprays by Both Nostrils route daily. 1 Bottle 5     No Known Allergies  Past Medical History:   Diagnosis Date    Depression     ED (erectile dysfunction)     Hypertension      History reviewed. No pertinent surgical history. Family History   Problem Relation Age of Onset    Diabetes Mother     Cancer Mother     Kidney Disease Father      Social History     Tobacco Use    Smoking status: Never Smoker    Smokeless tobacco: Never Used   Substance Use Topics    Alcohol use:  Yes     Alcohol/week: 0.6 oz     Types: 1 Glasses of wine per week        Lab Results   Component Value Date/Time    Cholesterol, total 183 01/08/2018 08:59 AM    HDL Cholesterol 41 01/08/2018 08:59 AM    LDL, calculated 119 (H) 01/08/2018 08:59 AM    Triglyceride 114 01/08/2018 08:59 AM    CHOL/HDL Ratio 4.5 11/11/2010 12:19 PM     Lab Results Component Value Date/Time    ALT (SGPT) 39 01/08/2018 08:59 AM    AST (SGOT) 29 01/08/2018 08:59 AM    Alk. phosphatase 95 01/08/2018 08:59 AM    Bilirubin, total 0.5 01/08/2018 08:59 AM    Albumin 4.4 01/08/2018 08:59 AM    Protein, total 7.0 01/08/2018 08:59 AM    PLATELET 033 61/60/7553 12:00 AM     Lab Results   Component Value Date/Time    GFR est non- 01/08/2018 08:59 AM    GFR est  01/08/2018 08:59 AM    Creatinine 0.89 01/08/2018 08:59 AM    BUN 14 01/08/2018 08:59 AM    Sodium 140 01/08/2018 08:59 AM    Potassium 3.8 01/08/2018 08:59 AM    Chloride 100 01/08/2018 08:59 AM    CO2 21 01/08/2018 08:59 AM     Lab Results   Component Value Date/Time    Glucose 102 (H) 01/08/2018 08:59 AM         Objective:     Visit Vitals  /80 (BP 1 Location: Left arm, BP Patient Position: Sitting)   Pulse 62   Temp 98.1 °F (36.7 °C) (Oral)   Resp 16   Ht 5' 9\" (1.753 m)   Wt 196 lb (88.9 kg)   SpO2 98%   BMI 28.94 kg/m²     WD WN male NAD  Heart RRR without murmers clicks or rubs  Lungs CTA  Abdo soft nontender  Ext no edema  . Assessment/Plan:   healthy adult male  continue present plan, routine labs ordered, call if any problems. Encounter Diagnoses   Name Primary?  Routine medical exam Yes     Orders Placed This Encounter    LIPID PANEL    BASIC METABOLIC PANEL    sertraline (ZOLOFT) 50 mg tablet    sildenafil citrate (VIAGRA) 50 mg tablet   . We discussed a screening exam STI testing and the  the pros and cons of having the test done. The patient made a decision to do the test: no, he dec. Depression stable  Routine labs. Follow-up and Dispositions    · Return if symptoms worsen or fail to improve.

## 2019-06-06 NOTE — PATIENT INSTRUCTIONS

## 2019-06-07 LAB
BUN SERPL-MCNC: 10 MG/DL (ref 6–24)
BUN/CREAT SERPL: 13 (ref 9–20)
CALCIUM SERPL-MCNC: 9.4 MG/DL (ref 8.7–10.2)
CHLORIDE SERPL-SCNC: 103 MMOL/L (ref 96–106)
CHOLEST SERPL-MCNC: 185 MG/DL (ref 100–199)
CO2 SERPL-SCNC: 21 MMOL/L (ref 20–29)
CREAT SERPL-MCNC: 0.76 MG/DL (ref 0.76–1.27)
GLUCOSE SERPL-MCNC: 109 MG/DL (ref 65–99)
HDLC SERPL-MCNC: 42 MG/DL
INTERPRETATION, 910389: NORMAL
LDLC SERPL CALC-MCNC: 120 MG/DL (ref 0–99)
POTASSIUM SERPL-SCNC: 4.1 MMOL/L (ref 3.5–5.2)
SODIUM SERPL-SCNC: 139 MMOL/L (ref 134–144)
TRIGL SERPL-MCNC: 116 MG/DL (ref 0–149)
VLDLC SERPL CALC-MCNC: 23 MG/DL (ref 5–40)

## 2019-06-07 NOTE — PROGRESS NOTES
Stable results  Blood sugar is up a little, please lose weight and reduce carbohydrates to reduce the chances that you will get diabetes. Blood sugar is not at a diabetes level.

## 2019-07-09 ENCOUNTER — OFFICE VISIT (OUTPATIENT)
Dept: INTERNAL MEDICINE CLINIC | Age: 49
End: 2019-07-09

## 2019-07-09 VITALS
OXYGEN SATURATION: 98 % | WEIGHT: 198 LBS | HEIGHT: 69 IN | HEART RATE: 60 BPM | SYSTOLIC BLOOD PRESSURE: 138 MMHG | RESPIRATION RATE: 16 BRPM | TEMPERATURE: 98 F | DIASTOLIC BLOOD PRESSURE: 88 MMHG | BODY MASS INDEX: 29.33 KG/M2

## 2019-07-09 DIAGNOSIS — I10 ESSENTIAL HYPERTENSION: ICD-10-CM

## 2019-07-09 DIAGNOSIS — Z02.4 ENCOUNTER FOR CDL (COMMERCIAL DRIVING LICENSE) EXAM: Primary | ICD-10-CM

## 2019-07-09 LAB
BILIRUB UR QL STRIP: NEGATIVE
GLUCOSE UR-MCNC: NEGATIVE MG/DL
KETONES P FAST UR STRIP-MCNC: NEGATIVE MG/DL
PH UR STRIP: 6 [PH] (ref 4.6–8)
PROT UR QL STRIP: NEGATIVE
SP GR UR STRIP: 1.01 (ref 1–1.03)
UA UROBILINOGEN AMB POC: NORMAL (ref 0.2–1)
URINALYSIS CLARITY POC: CLEAR
URINALYSIS COLOR POC: YELLOW
URINE BLOOD POC: NORMAL
URINE LEUKOCYTES POC: NORMAL
URINE NITRITES POC: NEGATIVE

## 2019-07-09 NOTE — PROGRESS NOTES
DOT PHYSICAL    Manuel Crespo 50 y.o. presents for a DOT physical.  He has the following concerns: none feels well. No dizziness, syncope or fainting, exertional chest pain, excessive shortness of breath, focal weakness, abdominal pain, severe depressed mood, thoughts of suicide, recreational drug abuse, excessive alcohol usage. No excessive sleepiness in the day time falling asleep at the wheel or any motor vehicle accidents. No diabetes. No insulin or narcotic agents. Patient Active Problem List   Diagnosis Code    Hypertension I10    Atopic dermatitis L20.9    Erectile dysfunction N52.9    Dyslipidemia E78.5    Advanced care planning/counseling discussion Z71.89    Mild major [facility] F32.0     Past Medical History:   Diagnosis Date    Depression     ED (erectile dysfunction)     Hypertension      No past surgical history on file. Social History     Socioeconomic History    Marital status:      Spouse name: Not on file    Number of children: 2    Years of education: Not on file    Highest education level: Not on file   Occupational History    Not on file   Social Needs    Financial resource strain: Not on file    Food insecurity:     Worry: Not on file     Inability: Not on file    Transportation needs:     Medical: Not on file     Non-medical: Not on file   Tobacco Use    Smoking status: Never Smoker    Smokeless tobacco: Never Used   Substance and Sexual Activity    Alcohol use:  Yes     Alcohol/week: 0.6 oz     Types: 1 Glasses of wine per week    Drug use: No    Sexual activity: Yes     Partners: Female   Lifestyle    Physical activity:     Days per week: Not on file     Minutes per session: Not on file    Stress: Not on file   Relationships    Social connections:     Talks on phone: Not on file     Gets together: Not on file     Attends Islam service: Not on file     Active member of club or organization: Not on file     Attends meetings of clubs or organizations: Not on file     Relationship status: Not on file    Intimate partner violence:     Fear of current or ex partner: Not on file     Emotionally abused: Not on file     Physically abused: Not on file     Forced sexual activity: Not on file   Other Topics Concern    Not on file   Social History Narrative    Lives with wife and 1 child 1 grown       Current Outpatient Medications   Medication Sig Dispense Refill    sertraline (ZOLOFT) 50 mg tablet TAKE 1 TABLET EVERY DAY 90 Tab 3    sildenafil citrate (VIAGRA) 50 mg tablet Take 1 Tab by mouth as needed (sex). 5 Tab 5    losartan-hydroCHLOROthiazide (HYZAAR) 50-12.5 mg per tablet Take 1 Tab by mouth daily. 90 Tab 3    mometasone (ELOCON) 0.1 % ointment Apply thin layer topically to affected areas 45 g 1    fluticasone (FLONASE) 50 mcg/actuation nasal spray 2 Sprays by Both Nostrils route daily. 1 Bottle 5         Results for orders placed or performed in visit on 06/06/19   LIPID PANEL   Result Value Ref Range    Cholesterol, total 185 100 - 199 mg/dL    Triglyceride 116 0 - 149 mg/dL    HDL Cholesterol 42 >39 mg/dL    VLDL, calculated 23 5 - 40 mg/dL    LDL, calculated 120 (H) 0 - 99 mg/dL   METABOLIC PANEL, BASIC   Result Value Ref Range    Glucose 109 (H) 65 - 99 mg/dL    BUN 10 6 - 24 mg/dL    Creatinine 0.76 0.76 - 1.27 mg/dL    GFR est non- >59 mL/min/1.73    GFR est  >59 mL/min/1.73    BUN/Creatinine ratio 13 9 - 20    Sodium 139 134 - 144 mmol/L    Potassium 4.1 3.5 - 5.2 mmol/L    Chloride 103 96 - 106 mmol/L    CO2 21 20 - 29 mmol/L    Calcium 9.4 8.7 - 10.2 mg/dL   CVD REPORT   Result Value Ref Range    INTERPRETATION Note          ROS: 12 point system revived,please see HPI for pertinent positives.       OBJECTIVE:     Visit Vitals  /88   Pulse 60   Temp 98 °F (36.7 °C) (Oral)   Resp 16   Ht 5' 9\" (1.753 m)   Wt 198 lb (89.8 kg)   SpO2 98%   BMI 29.24 kg/m² Vision meet standards and hearing test good. Visual Acuity Screening    Right eye Left eye Both eyes   Without correction: 20/20 20/20 20/20   With correction:             Physical Examination:    General appearance - alert, well appearing, and in no distress. HEENT  PERRLA, EOMI, Sclera clear, anicteric, Oropharynx clear, no lesions. Chest - RRR S1, S2 heard, no peripheral edema. Lungs- Clear to auscultation, no wheezes, rales or rhonchi, has symmetric air entry    Neck- Supple, No adenopathy. Neuro- CN 2 to 11 grossly normal, No neuro deficits              DTR 2/4, strength on upper/lower ext 5/5 gustavo    Abdomen/groin- ND,NT, No hernia    ROM: Good ROM of upper and lower extremities. Feet normal      Psy- Mood and Affect WNL      ASSESSMENT/PLAN       ICD-10-CM ICD-9-CM    1. 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 Essential hypertension I10 401.9          Healthy appearing person. Advised routine care with PCP. 1 yr DOT card with no restrictions given. DOT papers kept in file and [facility] web site updated. I have discussed the diagnosis with the patient and the intended plan as seen in the above orders. The patient has received an after-visit summary and questions were answered concerning future plans. I have discussed medication side effects and warnings with the patient as well. Pt verbalizes understanding.

## 2019-09-12 RX ORDER — SILDENAFIL 50 MG/1
TABLET, FILM COATED ORAL
Qty: 5 TAB | Refills: 5 | Status: SHIPPED | OUTPATIENT
Start: 2019-09-12 | End: 2020-09-10

## 2020-06-24 ENCOUNTER — OFFICE VISIT (OUTPATIENT)
Dept: INTERNAL MEDICINE CLINIC | Age: 50
End: 2020-06-24

## 2020-06-24 VITALS
HEIGHT: 69 IN | DIASTOLIC BLOOD PRESSURE: 82 MMHG | BODY MASS INDEX: 29.62 KG/M2 | TEMPERATURE: 97.2 F | WEIGHT: 200 LBS | RESPIRATION RATE: 16 BRPM | HEART RATE: 68 BPM | SYSTOLIC BLOOD PRESSURE: 134 MMHG | OXYGEN SATURATION: 98 %

## 2020-06-24 DIAGNOSIS — H90.0 CONDUCTIVE HEARING LOSS, BILATERAL: ICD-10-CM

## 2020-06-24 DIAGNOSIS — Z00.00 ROUTINE MEDICAL EXAM: Primary | ICD-10-CM

## 2020-06-24 DIAGNOSIS — E78.5 DYSLIPIDEMIA: ICD-10-CM

## 2020-06-24 DIAGNOSIS — Z23 ENCOUNTER FOR IMMUNIZATION: ICD-10-CM

## 2020-06-24 DIAGNOSIS — F32.0 MILD MAJOR DEPRESSION, SINGLE EPISODE (HCC): ICD-10-CM

## 2020-06-24 DIAGNOSIS — I10 ESSENTIAL HYPERTENSION: ICD-10-CM

## 2020-06-24 PROBLEM — H90.2 CONDUCTIVE HEARING LOSS: Status: ACTIVE | Noted: 2020-06-24

## 2020-06-24 NOTE — PROGRESS NOTES
Subjective:   Jo-Ann Ahuja is a 52 y.o. male presenting for his annual checkup. ROS:  Feeling well. No dyspnea or chest pain on exertion. No abdominal pain, change in bowel habits, black or bloody stools. No urinary tract or prostatic symptoms. No neurological complaints. Specific concerns today: none. Patient Active Problem List    Diagnosis Date Noted    Mild major depression, single episode ([address] 06/06/2019    Advanced care planning/counseling discussion 11/30/2015    Hypertension 06/28/2012    Atopic dermatitis 06/28/2012    Erectile dysfunction 06/28/2012    Dyslipidemia 06/28/2012     Current Outpatient Medications   Medication Sig Dispense Refill    sertraline (ZOLOFT) 50 mg tablet TAKE 1 TABLET BY MOUTH EVERY DAY 90 Tab 1    losartan-hydroCHLOROthiazide (HYZAAR) 50-12.5 mg per tablet TAKE 1 TABLET BY MOUTH EVERY DAY 90 Tab 1    sildenafil citrate (VIAGRA) 50 mg tablet TAKE 1 TABLET BY MOUTH AS NEEDED 5 Tab 5    mometasone (ELOCON) 0.1 % ointment Apply thin layer topically to affected areas 45 g 1    fluticasone (FLONASE) 50 mcg/actuation nasal spray 2 Sprays by Both Nostrils route daily. 1 Bottle 5     No Known Allergies  Past Medical History:   Diagnosis Date    Depression     ED (erectile dysfunction)     Hypertension      History reviewed. No pertinent surgical history. Family History   Problem Relation Age of Onset    Diabetes Mother     Cancer Mother     Kidney Disease Father      Social History     Tobacco Use    Smoking status: Never Smoker    Smokeless tobacco: Never Used   Substance Use Topics    Alcohol use:  Yes     Alcohol/week: 1.0 standard drinks     Types: 1 Glasses of wine per week        Lab Results   Component Value Date/Time    Cholesterol, total 185 06/06/2019 08:48 AM    HDL Cholesterol 42 06/06/2019 08:48 AM    LDL, calculated 120 (H) 06/06/2019 08:48 AM    Triglyceride 116 06/06/2019 08:48 AM    CHOL/HDL Ratio 4.5 11/11/2010 12:19 PM     Lab Results Component Value Date/Time    GFR est non- 06/06/2019 08:48 AM    GFR est  06/06/2019 08:48 AM    Creatinine 0.76 06/06/2019 08:48 AM    BUN 10 06/06/2019 08:48 AM    Sodium 139 06/06/2019 08:48 AM    Potassium 4.1 06/06/2019 08:48 AM    Chloride 103 06/06/2019 08:48 AM    CO2 21 06/06/2019 08:48 AM     Lab Results   Component Value Date/Time    Glucose 109 (H) 06/06/2019 08:48 AM         Objective:     Visit Vitals  BP (!) 147/94   Pulse 68   Temp 97.2 °F (36.2 °C) (Temporal)   Resp 16   Ht 5' 9\" (1.753 m)   Wt 200 lb (90.7 kg)   SpO2 98%   BMI 29.53 kg/m²     The patient appears well, alert, oriented x 3, in no distress. ENT normal.  Has Bi aides Neck supple. No adenopathy or thyromegaly. JUAN. Lungs are clear, good air entry, no wheezes, rhonchi or rales. S1 and S2 normal, no murmurs, regular rate and rhythm. Abdomen is soft without tenderness, guarding, mass or organomegaly.  exam: def. Extremities show no edema. Neurological is normal without focal findings. Assessment/Plan:   healthy adult male  follow low fat diet, routine labs ordered, call if any problems. Encounter Diagnoses   Name Primary?  Routine medical exam Yes    Essential hypertension     Dyslipidemia     Mild major depression, single episode ([address]     Conductive hearing loss, bilateral     Encounter for immunization      Orders Placed This Encounter    TETANUS, DIPHTHERIA TOXOIDS AND ACELLULAR PERTUSSIS VACCINE (TDAP), IN INDIVIDS. >=7, IM    METABOLIC PANEL, BASIC    LIPID PANEL    DISCONTD: diph,Pertuss,Acell,,Tet Vac-PF (ADACEL) 2 Lf-(2.5-5-3-5 mcg)-5Lf/0.5 mL susp   . Epi Ra We discussed starting a statin med  10 yr risk 9% for CV event, he dec will do diet. Routine labs ordered    Follow-up and Dispositions    · Return in about 4 weeks (around 7/22/2020).        For CDL

## 2020-06-24 NOTE — PROGRESS NOTES
Chief Complaint   Patient presents with    Hypertension     follow up     Health Maintenance reviewed. I have reviewed the patient's medical history in detail and updated the computerized patient record. Patient has not been out of the country in (3-4 months) 90 -120 days, NO diarrhea, NO cough, NO chest conjestion, NO temp. Pt has not been around anyone with these symptoms. 1. Have you been to the ER, urgent care clinic since your last visit? Hospitalized since your last visit?no    2. Have you seen or consulted any other health care providers outside of the [address] [place] [address] since your last visit? Include any pap smears or colon screening. No    Encouraged pt to discuss pt's wishes with spouse/partner/family and bring them in the next appt to follow thru with the Advanced Directive    Fall Risk Assessment, last 12 mths 6/24/2020   Able to walk? Yes   Fall in past 12 months? No       3 most recent PHQ Screens 6/24/2020   Little interest or pleasure in doing things Several days   Feeling down, depressed, irritable, or hopeless Several days   Total Score PHQ 2 2       Abuse Screening Questionnaire 6/24/2020   Do you ever feel afraid of your partner? N   Are you in a relationship with someone who physically or mentally threatens you? N   Is it safe for you to go home?  Y       ADL Assessment 6/24/2020   Feeding yourself No Help Needed   Getting from bed to chair No Help Needed   Getting dressed No Help Needed   Bathing or showering No Help Needed   Walk across the room (includes cane/walker) No Help Needed   Using the telphone No Help Needed   Taking your medications No Help Needed   Preparing meals No Help Needed   Managing money (expenses/bills) No Help Needed   Moderately strenuous housework (laundry) No Help Needed   Shopping for personal items (toiletries/medicines) No Help Needed   Shopping for groceries No Help Needed   Driving No Help Needed   Climbing a flight of stairs No Help Needed Getting to places beyond walking distances No Help Needed

## 2020-06-24 NOTE — PATIENT INSTRUCTIONS
Well Visit, Ages 25 to 48: Care Instructions  Your Care Instructions     Physical exams can help you stay healthy. Your doctor has checked your overall health and may have suggested ways to take good care of yourself. He or she also may have recommended tests. At home, you can help prevent illness with healthy eating, regular exercise, and other steps. Follow-up care is a key part of your treatment and safety. Be sure to make and go to all appointments, and call your doctor if you are having problems. It's also a good idea to know your test results and keep a list of the medicines you take. How can you care for yourself at home? · Reach and stay at a healthy weight. This will lower your risk for many problems, such as obesity, diabetes, heart disease, and high blood pressure. · Get at least 30 minutes of physical activity on most days of the week. Walking is a good choice. You also may want to do other activities, such as running, swimming, cycling, or playing tennis or team sports. Discuss any changes in your exercise program with your doctor. · Do not smoke or allow others to smoke around you. If you need help quitting, talk to your doctor about stop-smoking programs and medicines. These can increase your chances of quitting for good. · Talk to your doctor about whether you have any risk factors for sexually transmitted infections (STIs). Having one sex partner (who does not have STIs and does not have sex with anyone else) is a good way to avoid these infections. · Use birth control if you do not want to have children at this time. Talk with your doctor about the choices available and what might be best for you. · Protect your skin from too much sun. When you're outdoors from 10 a.m. to 4 p.m., stay in the shade or cover up with clothing and a hat with a wide brim. Wear sunglasses that block UV rays. Even when it's cloudy, put broad-spectrum sunscreen (SPF 30 or higher) on any exposed skin.   · See a dentist one or two times a year for checkups and to have your teeth cleaned. · Wear a seat belt in the car. Follow your doctor's advice about when to have certain tests. These tests can spot problems early. For everyone  · Cholesterol. Have the fat (cholesterol) in your blood tested after age 21. Your doctor will tell you how often to have this done based on your age, family history, or other things that can increase your risk for heart disease. · Blood pressure. Have your blood pressure checked during a routine doctor visit. Your doctor will tell you how often to check your blood pressure based on your age, your blood pressure results, and other factors. · Vision. Talk with your doctor about how often to have a glaucoma test.  · Diabetes. Ask your doctor whether you should have tests for diabetes. · Colon cancer. Your risk for colorectal cancer gets higher as you get older. Some experts say that adults should start regular screening at age 48 and stop at age 76. Others say to start before age 48 or continue after age 76. Talk with your doctor about your risk and when to start and stop screening. For women  · Breast exam and mammogram. Talk to your doctor about when you should have a clinical breast exam and a mammogram. Medical experts differ on whether and how often women under 50 should have these tests. Your doctor can help you decide what is right for you. · Cervical cancer screening test and pelvic exam. Begin with a Pap test at age 24. The test often is part of a pelvic exam. Starting at age 58039 Jamie Varela, you may choose to have a Pap test, an HPV test, or both tests at the same time (called co-testing). Talk with your doctor about how often to have testing. · Tests for sexually transmitted infections (STIs). Ask whether you should have tests for STIs. You may be at risk if you have sex with more than one person, especially if your partners do not wear condoms.   For men  · Tests for sexually transmitted infections (STIs). Ask whether you should have tests for STIs. You may be at risk if you have sex with more than one person, especially if you do not wear a condom. · Testicular cancer exam. Ask your doctor whether you should check your testicles regularly. · Prostate exam. Talk to your doctor about whether you should have a blood test (called a PSA test) for prostate cancer. Experts differ on whether and when men should have this test. Some experts suggest it if you are older than 39 and are -American or have a father or brother who got prostate cancer when he was younger than 72. When should you call for help? Watch closely for changes in your health, and be sure to contact your doctor if you have any problems or symptoms that concern you. Where can you learn more? Go to http://landyLangharchanel.info/  Enter P072 in the search box to learn more about \"Well Visit, Ages 25 to 48: Care Instructions. \"  Current as of: August 22, 2019               Content Version: 12.5  © 2228-5666 [employer]. Care instructions adapted under license by [employer] (which disclaims liability or warranty for this information). If you have questions about a medical condition or this instruction, always ask your healthcare professional. [address] any warranty or liability for your use of this information. Learning About Low-Fat Eating  What is low-fat eating? Most food has some fat in it. Your body needs some fat to be healthy. But some kinds of fats are healthier than others. In a low-fat eating plan, you try to choose healthier fats and eat fewer unhealthy fats. Healthy fats include olive and canola oil. Try to avoid eating too much saturated fat (such as in cheese and meats) and trans fat (a type of fat found in many packaged snack foods and other baked goods). You do not need to cut all fat from your diet.  But you can make healthier choices about the types and amount of fat you eat. Even though it is a good idea to choose healthier fats, it is still important to be careful of how much fat you eat, because all fats are high in calories. What are the different types of fats? Unhealthy fat  · Saturated fat. Saturated fats are mostly in animal foods, such as meat and dairy foods. Tropical oils, such as coconut oil, palm oil, and cocoa butter, are also saturated fats. Healthy fats  · Monounsaturated fat. Monounsaturated fats are liquid at room temperature but get solid when refrigerated. Eating foods that are high in this fat may help lower your \"bad\" (LDL) cholesterol, keep your \"good\" (HDL) cholesterol level up, and lower your chances of getting coronary artery disease. This fat is found in canola oil, olive oil, peanut oil, olives, avocados, nuts, and nut butters. · Polyunsaturated fat. Polyunsaturated fats are liquid at room temperature. They are in safflower, sunflower, and corn oils. They are also the main fat in seafood. Omega-3 fatty acids are types of polyunsaturated fat. Eating fish may lower your chances of getting coronary artery disease. Fatty fish such as salmon and mackerel contain these healthy fatty acids. So do ground flaxseeds and flaxseed oil, soybeans, walnuts, and seeds. Why cut down on unhealthy fats? Eating foods that contain saturated fats can raise the LDL (\"bad\") cholesterol in your blood. Having a high level of LDL cholesterol increases your chance of hardening of the arteries (atherosclerosis), which can lead to heart disease, heart attack, and stroke. Trans fat raises the level of \"bad\" LDL cholesterol in your blood and may lower the \"good\" HDL cholesterol in your blood. Trans fat can raise your risk of heart disease, heart attack, and stroke. In general:  · No more than 10% of your daily calories should come from saturated fat. This is about 20 grams in a 2,000-calorie diet.   · No more than 10% of your daily calories should come from polyunsaturated fat. This is about 20 grams in a 2,000-calorie diet. · Monounsaturated fats can be up to 15% of your daily calories. This is about 25 to 30 grams in a 2,000-calorie diet. If you're not sure how much fat you should be eating or how many calories you need each day to stay at a healthy weight, talk to a registered dietitian. He or she can help you create a plan that's right for you. What can you do to cut down on fat? Foods like cheese, butter, sausage, and desserts can have a lot of unhealthy fats. Try these tips for healthier meals at home and when you eat out. At home  · Fill up on fruits, vegetables, and whole grains. · Think of meat as a side dish instead of as the main part of your meal.  · When you do eat meat, make it extra-lean ground beef (97% lean), ground turkey breast (without skin added), meats with fat trimmed off before cooking, or skinless chicken. · Try main dishes that use whole wheat pasta, brown rice, dried beans, or vegetables. · Use cooking methods that use little or no fat, such as broiling, steaming, or grilling. Use cooking spray instead of oil. If you use oil, use a monounsaturated oil, such as canola or olive oil. · Read food labels on canned, bottled, or packaged foods. Choose those with little saturated fat and no trans fat. When eating out at a restaurant  · Order foods that are broiled or poached instead of fried or breaded. · Cut back on the amount of butter or margarine that you use on bread. Use small amounts of olive oil instead. · Order sauces, gravies, and salad dressings on the side, and use only a little. · When you order pasta, choose tomato sauce instead of cream sauce. · Ask for salsa with your baked potato instead of sour cream, butter, cheese, or madison. Where can you learn more? Go to http://landy-chanel.info/  Enter [id number] in the search box to learn more about \"Learning About Low-Fat Eating. \"  Current as of: August 22, 2019               Content Version: 12.5  © 6559-8039 [employer]. Care instructions adapted under license by [employer] (which disclaims liability or warranty for this information). If you have questions about a medical condition or this instruction, always ask your healthcare professional. [address] any warranty or liability for your use of this information.

## 2020-07-02 LAB
BUN SERPL-MCNC: 13 MG/DL (ref 6–24)
BUN/CREAT SERPL: 13 (ref 9–20)
CALCIUM SERPL-MCNC: 9.5 MG/DL (ref 8.7–10.2)
CHLORIDE SERPL-SCNC: 99 MMOL/L (ref 96–106)
CHOLEST SERPL-MCNC: 186 MG/DL (ref 100–199)
CO2 SERPL-SCNC: 20 MMOL/L (ref 20–29)
CREAT SERPL-MCNC: 0.97 MG/DL (ref 0.76–1.27)
GLUCOSE SERPL-MCNC: 152 MG/DL (ref 65–99)
HDLC SERPL-MCNC: 38 MG/DL
INTERPRETATION, 910389: NORMAL
LDLC SERPL CALC-MCNC: 126 MG/DL (ref 0–99)
POTASSIUM SERPL-SCNC: 4.1 MMOL/L (ref 3.5–5.2)
SODIUM SERPL-SCNC: 136 MMOL/L (ref 134–144)
TRIGL SERPL-MCNC: 109 MG/DL (ref 0–149)
VLDLC SERPL CALC-MCNC: 22 MG/DL (ref 5–40)

## 2020-07-08 ENCOUNTER — OFFICE VISIT (OUTPATIENT)
Dept: INTERNAL MEDICINE CLINIC | Age: 50
End: 2020-07-08

## 2020-07-08 VITALS
DIASTOLIC BLOOD PRESSURE: 89 MMHG | TEMPERATURE: 97.8 F | OXYGEN SATURATION: 98 % | HEART RATE: 60 BPM | RESPIRATION RATE: 16 BRPM | HEIGHT: 69 IN | BODY MASS INDEX: 29.47 KG/M2 | WEIGHT: 199 LBS | SYSTOLIC BLOOD PRESSURE: 139 MMHG

## 2020-07-08 DIAGNOSIS — Z02.89 ENCOUNTER FOR OCCUPATIONAL PHYSICAL EXAMINATION: Primary | ICD-10-CM

## 2020-07-08 DIAGNOSIS — I10 ESSENTIAL HYPERTENSION: ICD-10-CM

## 2020-07-08 RX ORDER — SERTRALINE HYDROCHLORIDE 50 MG/1
TABLET, FILM COATED ORAL
Qty: 90 TAB | Refills: 3 | Status: SHIPPED | OUTPATIENT
Start: 2020-07-08 | End: 2021-07-28 | Stop reason: SDUPTHER

## 2020-07-08 RX ORDER — LOSARTAN POTASSIUM AND HYDROCHLOROTHIAZIDE 12.5; 5 MG/1; MG/1
1 TABLET ORAL DAILY
Qty: 90 TAB | Refills: 3 | Status: SHIPPED | OUTPATIENT
Start: 2020-07-08 | End: 2021-07-10

## 2020-07-08 NOTE — PROGRESS NOTES
Chief Complaint   Patient presents with    Physical     DOT     Health Maintenance reviewed. I have reviewed the patient's medical history in detail and updated the computerized patient record. 1. Have you been to the ER, urgent care clinic since your last visit? Hospitalized since your last visit?no    2. Have you seen or consulted any other health care providers outside of the [address] [place] [address] Juan Manuel since your last visit? Include any pap smears or colon screening. No    Encouraged pt to discuss pt's wishes with spouse/partner/family and bring them in the next appt to follow thru with the Advanced Directive      Fall Risk Assessment, last 12 mths 7/8/2020   Able to walk? Yes   Fall in past 12 months? No       3 most recent PHQ Screens 7/8/2020   Little interest or pleasure in doing things Several days   Feeling down, depressed, irritable, or hopeless Several days   Total Score PHQ 2 2       Abuse Screening Questionnaire 7/8/2020   Do you ever feel afraid of your partner? N   Are you in a relationship with someone who physically or mentally threatens you? N   Is it safe for you to go home?  Y       ADL Assessment 7/8/2020   Feeding yourself No Help Needed   Getting from bed to chair No Help Needed   Getting dressed No Help Needed   Bathing or showering No Help Needed   Walk across the room (includes cane/walker) No Help Needed   Using the telphone No Help Needed   Taking your medications No Help Needed   Preparing meals No Help Needed   Managing money (expenses/bills) No Help Needed   Moderately strenuous housework (laundry) No Help Needed   Shopping for personal items (toiletries/medicines) No Help Needed   Shopping for groceries No Help Needed   Driving No Help Needed   Climbing a flight of stairs No Help Needed   Getting to places beyond walking distances No Help Needed

## 2020-07-08 NOTE — PROGRESS NOTES
DOT PHYSICAL    Aditya Cargo 52 y.o. presents for a DOT physical.  He has the following concerns: none    No dizziness, syncope or fainting, exertional chest pain, excessive shortness of breath, focal weakness, abdominal pain, severe depressed mood, thoughts of suicide, recreational drug abuse, excessive alcohol usage. No excessive sleepiness in the day time falling asleep at the wheel or any motor vehicle accidents. No diabetes. No insulin or narcotic agents. Patient Active Problem List   Diagnosis Code    Hypertension I10    Atopic dermatitis L20.9    Erectile dysfunction N52.9    Dyslipidemia E78.5    [facility] major [facility] F32.0    Conductive hearing loss H90.2     Past Medical History:   Diagnosis Date    Depression     ED (erectile dysfunction)     Hypertension      History reviewed. No pertinent surgical history. Social History     Socioeconomic History    Marital status:      Spouse name: Not on file    Number of children: 2    Years of education: Not on file    Highest education level: Not on file   Occupational History    Occupation: gas line   Social Needs    Financial resource strain: Not on file    Food insecurity     Worry: Not on file     Inability: Not on file   Hubub needs     Medical: Not on file     Non-medical: Not on file   Tobacco Use    Smoking status: Never Smoker    Smokeless tobacco: Never Used   Substance and Sexual Activity    Alcohol use:  Yes     Alcohol/week: 1.0 standard drinks     Types: 1 Glasses of wine per week    Drug use: No    Sexual activity: Yes     Partners: Female   Lifestyle    Physical activity     Days per week: Not on file     Minutes per session: Not on file    Stress: Not on file   Relationships    Social connections     Talks on phone: Not on file     Gets together: Not on file     Attends Yazidi service: Not on file     Active member of club or organization: Not on file     Attends meetings of clubs or organizations: Not on file     Relationship status: Not on file    Intimate partner violence     Fear of current or ex partner: Not on file     Emotionally abused: Not on file     Physically abused: Not on file     Forced sexual activity: Not on file   Other Topics Concern    Not on file   Social History Narrative    Lives with wife and 1 child 1 grown       Current Outpatient Medications   Medication Sig Dispense Refill    sertraline (ZOLOFT) 50 mg tablet TAKE 1 TABLET BY MOUTH EVERY DAY 90 Tab 1    losartan-hydroCHLOROthiazide (HYZAAR) 50-12.5 mg per tablet TAKE 1 TABLET BY MOUTH EVERY DAY 90 Tab 1    sildenafil citrate (VIAGRA) 50 mg tablet TAKE 1 TABLET BY MOUTH AS NEEDED 5 Tab 5    mometasone (ELOCON) 0.1 % ointment Apply thin layer topically to affected areas 45 g 1    fluticasone (FLONASE) 50 mcg/actuation nasal spray 2 Sprays by Both Nostrils route daily. 1 Bottle 5         Results for orders placed or performed in visit on 07/08/20   AMB POC URINALYSIS DIP STICK AUTO W/O MICRO   Result Value Ref Range    Color (UA POC) Yellow     Clarity (UA POC) Clear     Glucose (UA POC) Negative Negative    Bilirubin (UA POC) Negative Negative    Ketones (UA POC) Negative Negative    Specific gravity (UA POC) 1.025 1.001 - 1.035    Blood (UA POC) 1+ Negative    pH (UA POC) 5.5 4.6 - 8.0    Protein (UA POC) Negative Negative    Urobilinogen (UA POC) 0.2 mg/dL 0.2 - 1    Nitrites (UA POC) Negative Negative    Leukocyte esterase (UA POC) Negative Negative         ROS: 12 point system revived,please see HPI for pertinent positives. OBJECTIVE:     Visit Vitals  /89   Pulse 60   Temp 97.8 °F (36.6 °C) (Temporal)   Resp 16   Ht 5' 9\" (1.753 m)   Wt 199 lb (90.3 kg)   SpO2 98%   BMI 29.39 kg/m²       Vision meet standards and hearing test good.      Visual Acuity Screening    Right eye Left eye Both eyes   Without correction: 20/20 20/20 20/20   With correction:      Hearing Screening Comments: Hearing L/R &gt;5 feet       Physical Examination:    General appearance - alert, well appearing, and in no distress. HEENT  PERRLA, EOMI, Sclera clear, anicteric, Oropharynx clear, no lesions. Chest - RRR S1, S2 heard, no peripheral edema. Lungs- Clear to auscultation, no wheezes, rales or rhonchi, has symmetric air entry    Neck- Supple, No adenopathy. Neuro- CN 2 to 11 grossly normal, No neuro deficits              DTR 2/4, strength on upper/lower ext 5/5 gustavo    Abdomen/groin- ND,NT, No hernia    ROM: Good ROM of upper and lower extremities. Feet normal      Psy- Mood and Affect WNL      ASSESSMENT/PLAN       ICD-10-CM ICD-9-CM    1. Encounter for occupational physical examination  Z02.89 V70.5 AMB POC URINALYSIS DIP STICK AUTO W/O MICRO      sertraline (ZOLOFT) 50 mg tablet      losartan-hydroCHLOROthiazide (HYZAAR) 50-12.5 mg per tablet   2. Essential hypertension  M40 829.0 METABOLIC PANEL, BASIC     Orders Placed This Encounter    METABOLIC PANEL, BASIC     Standing Status:   Future     Standing Expiration Date:   1/8/2021    AMB POC URINALYSIS DIP STICK AUTO W/O MICRO    sertraline (ZOLOFT) 50 mg tablet     Sig: TAKE 1 TABLET BY MOUTH EVERY DAY     Dispense:  90 Tab     Refill:  3    losartan-hydroCHLOROthiazide (HYZAAR) 50-12.5 mg per tablet     Sig: Take 1 Tab by mouth daily. Dispense:  90 Tab     Refill:  3         Healthy appearing person. Advised routine care with PCP. 1 yr DOT card with no restrictions given. DOT papers kept in file and [facility] web site updated. I have discussed the diagnosis with the patient and the intended plan as seen in the above orders. The patient has received an after-visit summary and questions were answered concerning future plans. I have discussed medication side effects and warnings with the patient as well. Pt verbalizes understanding.

## 2020-07-13 NOTE — PROGRESS NOTES
Blood sugar is up a little, please lose weight and reduce carbohydrates to reduce the chances that you will get diabetes. Blood sugar is not at a diabetes level. We will need to repeat blood glucose in 6 months.

## 2020-09-10 RX ORDER — SILDENAFIL 50 MG/1
TABLET, FILM COATED ORAL
Qty: 5 TAB | Refills: 5 | Status: SHIPPED | OUTPATIENT
Start: 2020-09-10 | End: 2021-05-25

## 2021-05-25 RX ORDER — SILDENAFIL 50 MG/1
TABLET, FILM COATED ORAL
Qty: 5 TABLET | Refills: 5 | Status: SHIPPED | OUTPATIENT
Start: 2021-05-25 | End: 2021-10-13 | Stop reason: SDUPTHER

## 2021-07-14 ENCOUNTER — OFFICE VISIT (OUTPATIENT)
Dept: INTERNAL MEDICINE CLINIC | Age: 51
End: 2021-07-14
Payer: COMMERCIAL

## 2021-07-14 VITALS
RESPIRATION RATE: 16 BRPM | WEIGHT: 181 LBS | HEART RATE: 61 BPM | TEMPERATURE: 98.3 F | HEIGHT: 69 IN | BODY MASS INDEX: 26.81 KG/M2 | DIASTOLIC BLOOD PRESSURE: 80 MMHG | SYSTOLIC BLOOD PRESSURE: 127 MMHG

## 2021-07-14 DIAGNOSIS — Z02.4 ENCOUNTER FOR CDL (COMMERCIAL DRIVING LICENSE) EXAM: Primary | ICD-10-CM

## 2021-07-14 DIAGNOSIS — R82.90 URINE ABNORMALITY: ICD-10-CM

## 2021-07-14 DIAGNOSIS — E78.00 ELEVATED CHOLESTEROL: ICD-10-CM

## 2021-07-14 DIAGNOSIS — E11.65 TYPE 2 DIABETES MELLITUS WITH HYPERGLYCEMIA, WITHOUT LONG-TERM CURRENT USE OF INSULIN (HCC): ICD-10-CM

## 2021-07-14 LAB
BILIRUB UR QL STRIP: NEGATIVE
GLUCOSE POC: >400 MG/DL
GLUCOSE UR-MCNC: NORMAL MG/DL
KETONES P FAST UR STRIP-MCNC: NEGATIVE MG/DL
PH UR STRIP: 5.5 [PH] (ref 4.6–8)
PROT UR QL STRIP: NEGATIVE
SP GR UR STRIP: 1.01 (ref 1–1.03)
UA UROBILINOGEN AMB POC: NORMAL (ref 0.2–1)
URINALYSIS CLARITY POC: CLEAR
URINALYSIS COLOR POC: YELLOW
URINE BLOOD POC: NORMAL
URINE LEUKOCYTES POC: NEGATIVE
URINE NITRITES POC: NEGATIVE

## 2021-07-14 PROCEDURE — 99214 OFFICE O/P EST MOD 30 MIN: CPT | Performed by: FAMILY MEDICINE

## 2021-07-14 PROCEDURE — 81003 URINALYSIS AUTO W/O SCOPE: CPT | Performed by: FAMILY MEDICINE

## 2021-07-14 PROCEDURE — 96372 THER/PROPH/DIAG INJ SC/IM: CPT | Performed by: FAMILY MEDICINE

## 2021-07-14 PROCEDURE — 82962 GLUCOSE BLOOD TEST: CPT | Performed by: FAMILY MEDICINE

## 2021-07-14 RX ORDER — METFORMIN HYDROCHLORIDE 500 MG/1
500 TABLET ORAL 2 TIMES DAILY WITH MEALS
Qty: 60 TABLET | Refills: 5 | Status: SHIPPED | OUTPATIENT
Start: 2021-07-14 | End: 2021-07-28 | Stop reason: ALTCHOICE

## 2021-07-14 NOTE — PROGRESS NOTES
Chief Complaint   Patient presents with    Physical     DOT     Patient has not been out of the country in (14 months), NO diarrhea, NO cough, NO chest conjestion, NO temp. Pt has not been around anyone with these symptoms. Health Maintenance reviewed. I have reviewed the patient's medical history in detail and updated the computerized patient record. 1. Have you been to the ER, urgent care clinic since your last visit? no  Hospitalized since your last visit?  no    2. Have you seen or consulted any other health care providers outside of the [address] [place] [address] since your last visit? no Include any pap smears or colon screening. Encouraged pt to discuss pt's wishes with spouse/partner/family and bring them in the next appt to follow thru with the Advanced Directive    @  [address], last 12 mths 7/8/2020   Able to walk? Yes   Fall in past 12 months? No       3 most recent PHQ Screens 7/14/2021   Little interest or pleasure in doing things Several days   Feeling down, depressed, irritable, or hopeless Several days   Total Score PHQ 2 2       Abuse Screening Questionnaire 7/14/2021   Do you ever feel afraid of your partner? N   Are you in a relationship with someone who physically or mentally threatens you? N   Is it safe for you to go home?  Y       ADL Assessment 7/14/2021   Feeding yourself No Help Needed   Getting from bed to chair No Help Needed   Getting dressed No Help Needed   Bathing or showering No Help Needed   Walk across the room (includes cane/walker) No Help Needed   Using the telphone No Help Needed   Taking your medications No Help Needed   Preparing meals No Help Needed   Managing money (expenses/bills) No Help Needed   Moderately strenuous housework (laundry) No Help Needed   Shopping for personal items (toiletries/medicines) No Help Needed   Shopping for groceries No Help Needed   Driving No Help Needed   Climbing a flight of stairs No Help Needed   Getting to places beyond walking distances No Help Needed       10:55 AM as per VORB from Dr. Steve Peng, gave 20 units of Regular Insulin, pt tolerated well. Marcela Conway LPN did the 2nd person to check insulin. 11:00AM [address] [place] [address] stated that stat BS was 555.    11:22AM Retook pt's BS and was [facility]. Dr. Steve Peng sent patient to have a light lunch and return in several hours.     2:10PM

## 2021-07-14 NOTE — PROGRESS NOTES
Subjective:     Teressa Paiz is a 48 y.o. male seen for follow-up of diabetes. 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 He has had hypoglycemic attacks. .no  Blood sugar control has been pretty bad lately, diabetes is a new diagnosis for him. Lab Results   Component Value Date/Time    Hemoglobin A1c 5.8 (H) 01/08/2018 08:59 AM    Hemoglobin A1c 6.0 (H) 07/12/2017 09:39 AM    Hemoglobin A1c 6.0 (H) 11/21/2016 11:32 AM    Glucose 152 (H) 07/01/2020 07:52 AM    Glucose POC >400 07/14/2021 09:24 AM    Microalb/Creat ratio (ug/mg creat.) 4.1 11/05/2014 08:22 AM    LDL, calculated 126 (H) 07/01/2020 07:52 AM    Creatinine 0.97 07/01/2020 07:52 AM       He has diabetes and hypertension. 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 Surprised to find out that his blood sugar was this high does not really have any complaints today not even noted urine frequency. Some fatigue. Reports taking blood pressure medications without side affects. No complaints of exertional chest pain, excessive shortness of breath or focal weakness. Minimal swelling in lower legs or dizziness with standing. Diet and Lifestyle: nonsmoker. Patient Active Problem List    Diagnosis Date Noted    Conductive hearing loss 06/24/2020    Mild major depression, single episode ([facility] [address] 06/06/2019    Hypertension 06/28/2012    Atopic dermatitis 06/28/2012    Erectile dysfunction 06/28/2012    Dyslipidemia 06/28/2012     No Known Allergies  Past Medical History:   Diagnosis Date    Depression     ED (erectile dysfunction)     Hypertension      Social History     Tobacco Use    Smoking status: Never Smoker    Smokeless tobacco: Never Used   Substance Use Topics    Alcohol use:  Yes     Alcohol/week: 1.0 standard drinks Types: 1 Glasses of wine per week        Lab Results   Component Value Date/Time    Glucose 152 (H) 07/01/2020 07:52 AM    Glucose POC >400 07/14/2021 09:24 AM         Review of Systems  Pertinent items are noted in HPI. Objective:     Significant for the following: No acute distress  Visit Vitals  /80   Pulse 61   Temp 98.3 °F (36.8 °C) (Oral)   Resp 16   Ht 5' 9\" (1.753 m)   Wt 181 lb (82.1 kg)   BMI 26.73 kg/m²     WD WN male NAD  Heart RRR without murmers clicks or rubs  Lungs CTA  Abdo soft nontender  Ext no edema      Lab review: orders written for new lab studies as appropriate; see orders. Assessment/Plan:     Follow-up diabetes poorly controlled, needs further observation, needs improvement. Diabetic issues reviewed with him: diabetic diet discussed in detail, written exchange diet given, all medications, side effects and compliance discussed carefully, glycohemoglobin and other lab monitoring discussed, long term diabetic complications discussed and labs immediately prior to next visit. ICD-10-CM ICD-9-CM    1. Urine abnormality  R82.90 791.9 AMB POC URINALYSIS DIP STICK AUTO W/O MICRO      AMB POC GLUCOSE BLOOD, BY GLUCOSE MONITORING DEVICE   2. Type 2 diabetes mellitus with hyperglycemia, without long-term current use of insulin ([facility])  G79.12 174.71 METABOLIC PANEL, COMPREHENSIVE     790.29 HEMOGLOBIN A1C WITH EAG   3.  Elevated cholesterol  E78.00 272.0 LIPID PANEL       Orders Placed This Encounter    LIPID PANEL     Standing Status:   Future     Standing Expiration Date:   8/19/4785    METABOLIC PANEL, COMPREHENSIVE     Standing Status:   Future     Standing Expiration Date:   1/14/2022    HEMOGLOBIN A1C WITH EAG     Standing Status:   Future     Standing Expiration Date:   7/14/2022    AMB POC URINALYSIS DIP STICK AUTO W/O MICRO    AMB POC GLUCOSE BLOOD, BY GLUCOSE MONITORING DEVICE    DISCONTD: insulin regular (NOVOLIN R, HUMULIN R) 100 unit/mL injection     Sig: Gave 20 units of insulin     Dispense:  20 Units     Refill:  0     As per VORB gave 20 units of Reg Ins     Order Specific Question:   Expiration Date     Answer:   10/30/2022     Order Specific Question:   Lot#     Answer:   [id number]     Order Specific Question:        Answer:   Morris Jeong     Order Specific Question:   NDC#     Answer:   [id number]    insulin regular (NOVOLIN R, HUMULIN R) injection 20 Units       Follow-up and Dispositions    · Return in about 2 weeks (around 7/28/2021) for routine follow up. Acute Diagnoses Addressed Today     Type 2 diabetes mellitus with hyperglycemia, without long-term current use of insulin (HCC)    -  Primary        Relevant Medications        metFORMIN (GLUCOPHAGE) 500 mg tablet        Other Relevant Orders        METABOLIC PANEL, COMPREHENSIVE        HEMOGLOBIN A1C WITH EAG    Urine abnormality            Relevant Orders        AMB POC URINALYSIS DIP STICK AUTO W/O MICRO (Completed)        AMB POC GLUCOSE BLOOD, BY GLUCOSE MONITORING DEVICE (Completed)    Elevated cholesterol            Relevant Orders        LIPID PANEL                                                       DOT PHYSICAL    Loyd Hernández 48 y.o. presents for a DOT physical.  He has the following concerns: No complaints    Reports taking blood pressure medications without side affects. No complaints of exertional chest pain, excessive shortness of breath or focal weakness. Minimal swelling in lower legs or dizziness with standing. Patient Active Problem List   Diagnosis Code    Hypertension I10    Atopic dermatitis L20.9    Erectile dysfunction N52.9    Dyslipidemia E78.5    Mild major depression, single episode ([address] F32.0    Conductive hearing loss H90.2     Past Medical History:   Diagnosis Date    Depression     ED (erectile dysfunction)     Hypertension      History reviewed. No pertinent surgical history.   Social History     Socioeconomic History    Marital status:      Spouse name: Not on file    Number of children: 2    Years of education: Not on file    Highest education level: Not on file   Occupational History    Occupation: gas line   Tobacco Use    Smoking status: Never Smoker    Smokeless tobacco: Never Used   Substance and Sexual Activity    Alcohol use: Yes     Alcohol/week: 1.0 standard drinks     Types: 1 Glasses of wine per week    Drug use: No    Sexual activity: Yes     Partners: Female   Other Topics Concern    Not on file   Social History Narrative    Lives with wife and 1 child 1 grown     Social Determinants of Health     Financial Resource Strain:     Difficulty of Paying Living Expenses:    Food Insecurity:     Worried About Running Out of Food in the Last Year:     [address] in the Last Year:    Transportation Needs:     Lack of Transportation (Medical):  Lack of Transportation (Non-Medical):    Physical Activity:     Days of Exercise per Week:     Minutes of Exercise per Session:    Stress:     Feeling of Stress :    Social Connections:     Frequency of Communication with Friends and Family:     Frequency of Social Gatherings with Friends and Family:     Attends Worship Services:     Active Member of Clubs or Organizations:     Attends Club or Organization Meetings:     Marital Status:    Intimate Partner Violence:     Fear of Current or Ex-Partner:     Emotionally Abused:     Physically Abused:     Sexually Abused:        Current Outpatient Medications   Medication Sig Dispense Refill    metFORMIN (GLUCOPHAGE) 500 mg tablet Take 1 Tablet by mouth two (2) times daily (with meals).  60 Tablet 5    losartan-hydroCHLOROthiazide (HYZAAR) 50-12.5 mg per tablet TAKE 1 TABLET BY MOUTH EVERY DAY 90 Tablet 0    sildenafil citrate (VIAGRA) 50 mg tablet TAKE 1 TABLET BY MOUTH AS NEEDED 5 Tablet 5    sertraline (ZOLOFT) 50 mg tablet TAKE 1 TABLET BY MOUTH EVERY DAY 90 Tab 3    mometasone (ELOCON) 0.1 % ointment Apply thin layer topically to affected areas 45 g 1    fluticasone (FLONASE) 50 mcg/actuation nasal spray 2 Sprays by Both Nostrils route daily. 1 Bottle 5         Results for orders placed or performed in visit on 07/14/21   AMB POC URINALYSIS DIP STICK AUTO W/O MICRO   Result Value Ref Range    Color (UA POC) Yellow     Clarity (UA POC) Clear     Glucose (UA POC) 1+ Negative    Bilirubin (UA POC) Negative Negative    Ketones (UA POC) Negative Negative    Specific gravity (UA POC) 1.015 1.001 - 1.035    Blood (UA POC) Trace Negative    pH (UA POC) 5.5 4.6 - 8.0    Protein (UA POC) Negative Negative    Urobilinogen (UA POC) 0.2 mg/dL 0.2 - 1    Nitrites (UA POC) Negative Negative    Leukocyte esterase (UA POC) Negative Negative   AMB POC GLUCOSE BLOOD, BY GLUCOSE MONITORING DEVICE   Result Value Ref Range    Glucose POC >400 MG/DL         ROS: 12 point system revived,please see HPI for pertinent positives. OBJECTIVE:     Visit Vitals  /80   Pulse 61   Temp 98.3 °F (36.8 °C) (Oral)   Resp 16   Ht 5' 9\" (1.753 m)   Wt 181 lb (82.1 kg)   BMI 26.73 kg/m²       Vision meet standards and hearing test good. Hearing Screening    125Hz 250Hz 500Hz 1000Hz 2000Hz 3000Hz 4000Hz 6000Hz 8000Hz   Right ear:            Left ear:            Comments: Hearing &gt;5 feet L/R      Visual Acuity Screening    Right eye Left eye Both eyes   Without correction: 20/20 20/20 20/20   With correction:             Physical Examination:    General appearance - alert, well appearing, and in no distress. HEENT  PERRLA, EOMI, Sclera clear, anicteric, Oropharynx clear, no lesions. Chest - RRR S1, S2 heard, no peripheral edema. Lungs- Clear to auscultation, no wheezes, rales or rhonchi, has symmetric air entry    Neck- Supple, No adenopathy.     Neuro- CN 2 to 11 grossly normal, No neuro deficits              DTR 2/4, strength on upper/lower ext 5/5 gustavo    Abdomen/groin- ND,NT, No hernia    ROM: Good ROM of upper and lower extremities. Feet normal      Psy- Mood and Affect WNL      ASSESSMENT/PLAN       ICD-10-CM ICD-9-CM    1. Encounter for CDL (commercial driving license) exam  G20.2 V70.5    2. Urine abnormality  R82.90 791.9 AMB POC URINALYSIS DIP STICK AUTO W/O MICRO      AMB POC GLUCOSE BLOOD, BY GLUCOSE MONITORING DEVICE   3. Type 2 diabetes mellitus with hyperglycemia, without long-term current use of insulin ([facility])  N32.87 068.51 METABOLIC PANEL, COMPREHENSIVE     790.29 HEMOGLOBIN A1C WITH EAG   4. Elevated cholesterol  E78.00 272.0 LIPID PANEL     Orders Placed This Encounter    LIPID PANEL     Standing Status:   Future     Standing Expiration Date:   9/77/3990    METABOLIC PANEL, COMPREHENSIVE     Standing Status:   Future     Standing Expiration Date:   1/14/2022    HEMOGLOBIN A1C WITH EAG     Standing Status:   Future     Standing Expiration Date:   7/14/2022    AMB POC URINALYSIS DIP STICK AUTO W/O MICRO    AMB POC GLUCOSE BLOOD, BY GLUCOSE MONITORING DEVICE    DISCONTD: insulin regular (NOVOLIN R, HUMULIN R) 100 unit/mL injection     Sig: Gave 20 units of insulin     Dispense:  20 Units     Refill:  0     As per VORB gave 20 units of Reg Ins     Order Specific Question:   Expiration Date     Answer:   10/30/2022     Order Specific Question:   Lot#     Answer:   [id number]     Order Specific Question:        Answer:   Evaristo Ramey     Order Specific Question:   NDC#     Answer:   [id number]    insulin regular (NOVOLIN R, HUMULIN R) injection 20 Units    metFORMIN (GLUCOPHAGE) 500 mg tablet     Sig: Take 1 Tablet by mouth two (2) times daily (with meals). Dispense:  60 Tablet     Refill:  5     One-time insulin to get his sugars down, hopefully Metformin will get his diabetes under control. 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 Healthy appearing person. Advised routine care with PCP.    0.25 yr DOT card with no restrictions given.     DOT papers kept in file and [employer] site updated. I have discussed the diagnosis with the patient and the intended plan as seen in the above orders. The patient has received an after-visit summary and questions were answered concerning future plans. I have discussed medication side effects and warnings with the patient as well. Pt verbalizes understanding.       Follow-up 2 weeks

## 2021-07-14 NOTE — PATIENT INSTRUCTIONS
Noninsulin Medicines for Type 2 Diabetes: Care Instructions  Overview     There are different types of noninsulin medicines for diabetes. Each works in a different way. But they all help you control your blood sugar. Some types help your body make insulin to lower your blood sugar. Others lower how much insulin your body needs. Some can slow how fast your body digests sugars. And some can remove extra glucose through your urine. You may need to take more than one medicine for diabetes. Two or more medicines may work better to lower your blood sugar level than just one does. · Metformin. This lowers how much glucose your liver makes. And it helps you respond better to insulin. It also lowers the amount of stored sugar that your liver releases when you are not eating. · Sulfonylureas. These help your body release more insulin. Some work for many hours. They can cause low blood sugar if you don't eat as you planned. An example is glipizide. · Thiazolidinediones. These reduce the amount of blood glucose. They also help you respond better to insulin. An example is pioglitazone. · SGLT2 inhibitors. These help to remove extra glucose through your urine. They may also help some people lose weight. An example is ertugliflozin. · DPP-4 inhibitors. These help your body raise the level of insulin after you eat. They also help your body make less of a hormone that raises blood sugar. An example is alogliptin. · Incretin hormones (GLP-1 receptor agonists). These help your body make a protein that can raise your insulin level and make you less hungry. They're given as shots or pills. An example is semaglutide. · Meglitinides. These help your body release insulin. They also help slow how your body digests sugars. So they can keep your blood sugar from rising too fast after you eat. · Alpha-glucosidase inhibitors. These keep starches from breaking down.  This means that they lower the amount of glucose absorbed when you eat. They don't help your body make more insulin. So they will not cause low blood sugar unless you use them with other medicines for diabetes. Follow-up care is a key part of your treatment and safety. Be sure to make and go to all appointments, and call your doctor if you are having problems. It's also a good idea to know your test results and keep a list of the medicines you take. How can you care for yourself at home? · Eat a healthy diet. Get some exercise each day. This may help you to reduce how much medicine you need. · Do not take other prescription or over-the-counter medicines, vitamins, herbal products, or supplements without talking to your doctor first. Some medicines for type 2 diabetes can cause problems with other medicines or supplements. · Tell your doctor if you plan to get pregnant. Some of these drugs are not safe for pregnant women. · Be safe with medicines. Take your medicines exactly as prescribed. Meglitinides and sulfonylureas can cause your blood sugar to drop very low. Call your doctor if you think you are having a problem with your medicine. · Check your blood sugar often. You can use a glucose monitor. Keeping track can help you know how certain foods, activities, and medicines affect your blood sugar. And it can help you keep your blood sugar from getting so low that it's not safe. When should you call for help? Call 911 anytime you think you may need emergency care. For example, call if:    · You passed out (lost consciousness).     · You are confused or cannot think clearly.     · Your blood sugar is very high or very low. Watch closely for changes in your health, and be sure to contact your doctor if:    · Your blood sugar stays outside the level your doctor set for you.     · You have any problems. Where can you learn more?   Go to http://www.gray.com/  Enter H153 in the search box to learn more about \"Noninsulin Medicines for Type 2 Diabetes: Care Instructions. \"  Current as of: August 31, 2020               Content Version: 12.8  © 1351-4475 [employer]. Care instructions adapted under license by [employer] (which disclaims liability or warranty for this information). If you have questions about a medical condition or this instruction, always ask your healthcare professional. [facility] any warranty or liability for your use of this information. Counting Carbohydrates: Care Instructions  Your Care Instructions     You don't have to eat special foods when you have diabetes. You just have to be careful to eat healthy foods. Carbohydrates (carbs) raise blood sugar higher and quicker than any other nutrient. Carbs are found in desserts, breads and cereals, and fruit. They're also in starchy vegetables. These include potatoes, corn, and grains such as rice and pasta. Carbs are also in milk and yogurt. The more carbs you eat at one time, the higher your blood sugar will rise. Spreading carbs all through the day helps keep your blood sugar levels within your target range. Counting carbs is one of the best ways to keep your blood sugar under control. If you use insulin, counting carbs helps you match the right amount of insulin to the number of grams of carbs in a meal. Then you can change your diet and insulin dose as needed. Testing your blood sugar several times a day can help you learn how carbs affect your blood sugar. A registered dietitian or certified diabetes educator can help you plan meals and snacks. Follow-up care is a key part of your treatment and safety. Be sure to make and go to all appointments, and call your doctor if you are having problems. It's also a good idea to know your test results and keep a list of the medicines you take. How can you care for yourself at home?   Know your daily amount of carbohydrates  Your daily amount depends on several things, such as your weight, how active you are, which diabetes medicines you take, and what your goals are for your blood sugar levels. A registered dietitian or certified diabetes educator can help you plan how many carbs to include in each meal and snack. For most adults, a guideline for the daily amount of carbs is:  · 45 to 60 grams at each meal. That's about the same as 3 to 4 carbohydrate servings. · 15 to 20 grams at each snack. That's about the same as 1 carbohydrate serving. Count carbs  Counting carbs lets you know how much rapid-acting insulin to take before you eat. If you use an insulin pump, you get a constant rate of insulin during the day. So the pump must be programmed at meals. This gives you extra insulin to cover the rise in blood sugar after meals. If you take insulin:  · Learn your own insulin-to-carb ratio. You and your diabetes health professional will figure out the ratio. You can do this by testing your blood sugar after meals. For example, you may need a certain amount of insulin for every 15 grams of carbs. · Add up the carb grams in a meal. Then you can figure out how many units of insulin to take based on your insulin-to-carb ratio. · Exercise lowers blood sugar. You can use less insulin than you would if you were not doing exercise. Keep in mind that timing matters. If you exercise within 1 hour after a meal, your body may need less insulin for that meal than it would if you exercised 3 hours after the meal. Test your blood sugar to find out how exercise affects your need for insulin. If you do or don't take insulin:  · Look at labels on packaged foods. This can tell you how many carbs are in a serving. You can also use guides from the American Diabetes Association. · Be aware of portions, or serving sizes. If a package has two servings and you eat the whole package, you need to double the number of grams of carbohydrate listed for one serving.   · Protein, fat, and fiber do not raise blood sugar as much as carbs do. If you eat a lot of these nutrients in a meal, your blood sugar will rise more slowly than it would otherwise. Eat from all food groups  · Eat at least three meals a day. · Plan meals to include food from all the food groups. The food groups include grains, fruits, dairy, proteins, and vegetables. · Talk to your dietitian or diabetes educator about ways to add limited amounts of sweets into your meal plan. · If you drink alcohol, talk to your doctor. It may not be recommended when you are taking certain diabetes medicines. Where can you learn more? Go to http://www.gray.com/  Enter G703 in the search box to learn more about \"Counting Carbohydrates: Care Instructions. \"  Current as of: August 31, 2020               Content Version: 12.8  © 2006-2021 [employer]. Care instructions adapted under license by [employer] (which disclaims liability or warranty for this information). If you have questions about a medical condition or this instruction, always ask your healthcare professional. [place] [address] any warranty or liability for your use of this information. Nutrition Tips for Diabetes: After Your Visit  Your Care Instructions  A healthy diet is important to manage diabetes. It helps you lose weight (if you need to) and keep it off. It gives you the nutrition and energy your body needs and helps prevent heart disease. But a diet for diabetes does not mean that you have to eat special foods. You can eat what your family eats, including occasional sweets and other favorites. But you do have to pay attention to how often you eat and how much you eat of certain foods. The right plan for you will give you meals that help you keep your blood sugar at healthy levels. Try to eat a variety of foods and to spread carbohydrate throughout the day.  Carbohydrate raises blood sugar higher and more quickly than any other nutrient does. Carbohydrate is found in sugar, breads and cereals, fruit, starchy vegetables such as potatoes and corn, and milk and yogurt. You may want to work with a dietitian or diabetes educator to help you plan meals and snacks. A dietitian or diabetes educator also can help you lose weight if that is one of your goals. The following tips can help you enjoy your meals and stay healthy. Follow-up care is a key part of your treatment and safety. Be sure to make and go to all appointments, and call your doctor if you are having problems. Its also a good idea to know your test results and keep a list of the medicines you take. How can you care for yourself at home? · Learn which foods have carbohydrate and how much carbohydrate to eat. A dietitian or diabetes educator can help you learn to keep track of how much carbohydrate you eat. · Spread carbohydrate throughout the day. Eat some carbohydrate at all meals, but do not eat too much at any one time. · Plan meals to include food from all the food groups. These are the food groups and some example portion sizes:  ¨ Grains: 1 slice of bread (1 ounce), ½ cup of cooked cereal, and 1/3 cup of cooked pasta or rice. These have about 15 grams of carbohydrate in a serving. Choose whole grains such as whole wheat bread or crackers, oatmeal, and brown rice more often than refined grains. ¨ Fruit: 1 small fresh fruit, such as an apple or orange; ½ of a banana; ½ cup of chopped, cooked, or canned fruit; ½ cup of fruit juice; 1 cup of melon or raspberries; and 2 tablespoons of dried fruit. These have about 15 grams of carbohydrate in a serving. ¨ Dairy: 1 cup of nonfat or low-fat milk and 2/3 cup of plain yogurt. These have about 15 grams of carbohydrate in a serving. ¨ Protein foods: Beef, chicken, turkey, fish, eggs, tofu, cheese, cottage cheese, and peanut butter. A serving size of meat is 3 ounces, which is about the size of a deck of cards.  Examples of meat substitute serving sizes (equal to 1 ounce of meat) are 1/4 cup of cottage cheese, 1 egg, 1 tablespoon of peanut butter, and ½ cup of tofu. These have very little or no carbohydrate per serving. ¨ Vegetables: Starchy vegetables such as ½ cup of cooked dried beans, peas, potatoes, or corn have about 15 grams of carbohydrate. Nonstarchy vegetables have very little carbohydrate, such as 1 cup of raw leafy vegetables (such as spinach), ½ cup of other vegetables (cooked or chopped), and 3/4 cup of vegetable juice. · Use the plate format to plan meals. It is a good, quick way to make sure that you have a balanced meal. It also helps you spread carbohydrate throughout the day. You divide your plate by types of foods. Put vegetables on half the plate, meat or meat substitutes on one-quarter of the plate, and a grain or starchy vegetable (such as brown rice or a potato) in the final quarter of the plate. To this you can add a small piece of fruit and 1 cup of milk or yogurt, depending on how much carbohydrate you are supposed to eat at a meal.  · Talk to your dietitian or diabetes educator about ways to add limited amounts of sweets into your meal plan. You can eat these foods now and then, as long as you include the amount of carbohydrate they have in your daily carbohydrate allowance. · If you drink alcohol, limit it to no more than 1 drink a day for women and 2 drinks a day for men. If you are pregnant, no amount of alcohol is known to be safe. · Protein, fat, and fiber do not raise blood sugar as much as carbohydrate does. If you eat a lot of these nutrients in a meal, your blood sugar will rise more slowly than it would otherwise. · Limit saturated fats, such as those from meat and dairy products. Try to replace it with monounsaturated fat, such as olive oil. This is a healthier choice because people who have diabetes are at higher-than-average risk of heart disease. But use a modest amount of olive oil.  A tablespoon of olive oil has 14 grams of fat and 120 calories. · Exercise lowers blood sugar. If you take insulin by shots or pump, you can use less than you would if you were not exercising. Keep in mind that timing matters. If you exercise within 1 hour after a meal, your body may need less insulin for that meal than it would if you exercised 3 hours after the meal. Test your blood sugar to find out how exercise affects your need for insulin. · Exercise on most days of the week. Aim for at least 30 minutes. Exercise helps you stay at a healthy weight and helps your body use insulin. Walking is an easy way to get exercise. Gradually increase the amount you walk every day. You also may want to swim, bike, or do other activities. When you eat out  · Learn to estimate the serving sizes of foods that have carbohydrate. If you measure food at home, it will be easier to estimate the amount in a serving of restaurant food. · If the meal you order has too much carbohydrate (such as potatoes, corn, or baked beans), ask to have a low-carbohydrate food instead. Ask for a salad or green vegetables. · If you use insulin, check your blood sugar before and after eating out to help you plan how much to eat in the future. · If you eat more carbohydrate at a meal than you had planned, take a walk or do other exercise. This will help lower your blood sugar. Where can you learn more? Go to [employer]  Enter C282 in the search box to learn more about \"Nutrition Tips for Diabetes: After Your Visit. \"   © 5021-3963 [employer]. Care instructions adapted under license by [employer] (which disclaims liability or warranty for this information).  This care instruction is for use with your licensed healthcare professional. If you have questions about a medical condition or this instruction, always ask your healthcare professional. [place] [address] any warranty or liability for your use of this information. Content Version: 85.2.705890; Current as of: June 4, 2014                 Learning About Meal Planning for Diabetes  Why plan your meals? Meal planning can be a key part of managing diabetes. Planning meals and snacks with the right balance of carbohydrate, protein, and fat can help you keep your blood sugar at the target level you set with your doctor. You don't have to eat special foods. You can eat what your family eats, including sweets once in a while. But you do have to pay attention to how often you eat and how much you eat of certain foods. You may want to work with a dietitian or a certified diabetes educator. He or she can give you tips and meal ideas and can answer your questions about meal planning. This health professional can also help you reach a healthy weight if that is one of your goals. What plan is right for you? Your dietitian or diabetes educator may suggest that you start with the plate format or carbohydrate counting. The plate format  The plate format is a simple way to help you manage how you eat. You plan meals by learning how much space each food should take on a plate. Using the plate format helps you spread carbohydrate throughout the day. It can make it easier to keep your blood sugar level within your target range. It also helps you see if you're eating healthy portion sizes. To use the plate format, you put non-starchy vegetables on half your plate. Add meat or meat substitutes on one-quarter of the plate. Put a grain or starchy vegetable (such as brown rice or a potato) on the final quarter of the plate. You can add a small piece of fruit and some low-fat or fat-free milk or yogurt, depending on your carbohydrate goal for each meal.  Here are some tips for using the plate format:  · Make sure that you are not using an oversized plate. A 9-inch plate is best. Many restaurants use larger plates. · Get used to using the plate format at home.  Then you can use it when you eat out. · Write down your questions about using the plate format. Talk to your doctor, a dietitian, or a diabetes educator about your concerns. Carbohydrate counting  With carbohydrate counting, you plan meals based on the amount of carbohydrate in each food. Carbohydrate raises blood sugar higher and more quickly than any other nutrient. It is found in desserts, breads and cereals, and fruit. It's also found in starchy vegetables such as potatoes and corn, grains such as rice and pasta, and milk and yogurt. Spreading carbohydrate throughout the day helps keep your blood sugar levels within your target range. Your daily amount depends on several things, including your weight, how active you are, which diabetes medicines you take, and what your goals are for your blood sugar levels. A registered dietitian or diabetes educator can help you plan how much carbohydrate to include in each meal and snack. A guideline for your daily amount of carbohydrate is:  · 45 to 60 grams at each meal. That's about the same as 3 to 4 carbohydrate servings. · 15 to 20 grams at each snack. That's about the same as 1 carbohydrate serving. The Nutrition Facts label on packaged foods tells you how much carbohydrate is in a serving of the food. First, look at the serving size on the food label. Is that the amount you eat in a serving? All of the nutrition information on a food label is based on that serving size. So if you eat more or less than that, you'll need to adjust the other numbers. Total carbohydrate is the next thing you need to look for on the label. If you count carbohydrate servings, one serving of carbohydrate is 15 grams. For foods that don't come with labels, such as fresh fruits and vegetables, you'll need a guide that lists carbohydrate in these foods. Ask your doctor, dietitian, or diabetes educator about books or other nutrition guides you can use.   If you take insulin, you need to know how many grams of carbohydrate are in a meal. This lets you know how much rapid-acting insulin to take before you eat. If you use an insulin pump, you get a constant rate of insulin during the day. So the pump must be programmed at meals to give you extra insulin to cover the rise in blood sugar after meals. When you know how much carbohydrate you will eat, you can take the right amount of insulin. Or, if you always use the same amount of insulin, you need to make sure that you eat the same amount of carbohydrate at meals. If you need more help to understand carbohydrate counting and food labels, ask your doctor, dietitian, or diabetes educator. How can you plan healthy meals? Here are some tips to get started:  · Plan your meals a week at a time. Don't forget to include snacks too. · Use cookbooks or online recipes to plan several main meals. Plan some quick meals for busy nights. You also can double some recipes that freeze well. Then you can save half for other busy nights when you don't have time to cook. · Make sure you have the ingredients you need for your recipes. If you're running low on basic items, put these items on your shopping list too. · List foods that you use to make breakfasts, lunches, and snacks. List plenty of fruits and vegetables. · Post this list on the refrigerator. Add to it as you think of more things you need. · Take the list to the store to do your weekly shopping. Follow-up care is a key part of your treatment and safety. Be sure to make and go to all appointments, and call your doctor if you are having problems. It's also a good idea to know your test results and keep a list of the medicines you take. Where can you learn more? Go to http://www.gray.com/  Enter V953 in the search box to learn more about \"Learning About Meal Planning for Diabetes. \"  Current as of: August 31, 2020               Content Version: 12.8  © 9008-9858 [employer]wise, [facility].    Care instructions adapted under license by [employer] (which disclaims liability or warranty for this information). If you have questions about a medical condition or this instruction, always ask your healthcare professional. [address] any warranty or liability for your use of this information.

## 2021-07-28 ENCOUNTER — OFFICE VISIT (OUTPATIENT)
Dept: INTERNAL MEDICINE CLINIC | Age: 51
End: 2021-07-28
Payer: COMMERCIAL

## 2021-07-28 VITALS
WEIGHT: 182 LBS | HEIGHT: 69 IN | SYSTOLIC BLOOD PRESSURE: 128 MMHG | BODY MASS INDEX: 26.96 KG/M2 | HEART RATE: 72 BPM | RESPIRATION RATE: 16 BRPM | DIASTOLIC BLOOD PRESSURE: 81 MMHG | TEMPERATURE: 98.2 F | OXYGEN SATURATION: 98 %

## 2021-07-28 DIAGNOSIS — E11.65 TYPE 2 DIABETES MELLITUS WITH HYPERGLYCEMIA, WITHOUT LONG-TERM CURRENT USE OF INSULIN (HCC): ICD-10-CM

## 2021-07-28 DIAGNOSIS — Z02.89 ENCOUNTER FOR OCCUPATIONAL PHYSICAL EXAMINATION: ICD-10-CM

## 2021-07-28 DIAGNOSIS — R21 RASH: ICD-10-CM

## 2021-07-28 DIAGNOSIS — F32.0 MILD MAJOR DEPRESSION, SINGLE EPISODE (HCC): ICD-10-CM

## 2021-07-28 DIAGNOSIS — B02.9 HERPES ZOSTER WITHOUT COMPLICATION: Primary | ICD-10-CM

## 2021-07-28 LAB — GLUCOSE POC: 431 MG/DL

## 2021-07-28 PROCEDURE — 82962 GLUCOSE BLOOD TEST: CPT | Performed by: FAMILY MEDICINE

## 2021-07-28 PROCEDURE — 99214 OFFICE O/P EST MOD 30 MIN: CPT | Performed by: FAMILY MEDICINE

## 2021-07-28 RX ORDER — VALACYCLOVIR HYDROCHLORIDE 1 G/1
1000 TABLET, FILM COATED ORAL 3 TIMES DAILY
Qty: 21 TABLET | Refills: 0 | Status: SHIPPED | OUTPATIENT
Start: 2021-07-28 | End: 2021-08-04

## 2021-07-28 RX ORDER — MOMETASONE FUROATE 1 MG/G
OINTMENT TOPICAL
Qty: 45 G | Refills: 5 | Status: SHIPPED | OUTPATIENT
Start: 2021-07-28 | End: 2022-09-20 | Stop reason: SDUPTHER

## 2021-07-28 RX ORDER — SERTRALINE HYDROCHLORIDE 50 MG/1
TABLET, FILM COATED ORAL
Qty: 90 TABLET | Refills: 3 | Status: SHIPPED | OUTPATIENT
Start: 2021-07-28 | End: 2022-03-22 | Stop reason: SDUPTHER

## 2021-07-28 RX ORDER — LOSARTAN POTASSIUM AND HYDROCHLOROTHIAZIDE 12.5; 5 MG/1; MG/1
1 TABLET ORAL DAILY
Qty: 90 TABLET | Refills: 1 | Status: SHIPPED | OUTPATIENT
Start: 2021-07-28 | End: 2021-08-01 | Stop reason: SDUPTHER

## 2021-07-28 NOTE — PROGRESS NOTES
Subjective:     Jimmy Hendrix is a 46 y.o. male seen for follow-up of diabetes. Recently discovered during routine CDL physical.  He has had hypoglycemic attacks. .no  Blood sugar control has been ok  Today's nonfasting blood sugar is 431    Lab Results   Component Value Date/Time    Hemoglobin A1c 5.8 (H) 01/08/2018 08:59 AM    Hemoglobin A1c 6.0 (H) 07/12/2017 09:39 AM    Hemoglobin A1c 6.0 (H) 11/21/2016 11:32 AM    Glucose 152 (H) 07/01/2020 07:52 AM    Glucose POC >400 07/14/2021 09:24 AM    Microalb/Creat ratio (ug/mg creat.) 4.1 11/05/2014 08:22 AM    LDL, calculated 126 (H) 07/01/2020 07:52 AM    Creatinine 0.97 07/01/2020 07:52 AM       He has diabetes and hypertension. Jimmy Hendrix has the additional concern of rask right torso, slight itching. Reports taking blood pressure medications without side affects. No complaints of exertional chest pain, excessive shortness of breath or focal weakness. Minimal swelling in lower legs or dizziness with standing. Some urine frequency has improved a little. Diet and Lifestyle: does not rigorously follow a diabetic diet, Wants to know more about what he should and should not be eating. Patient Active Problem List    Diagnosis Date Noted    Conductive hearing loss 06/24/2020    Mild major depression, single episode ([facility] [address] 06/06/2019    Hypertension 06/28/2012    Atopic dermatitis 06/28/2012    Erectile dysfunction 06/28/2012    Dyslipidemia 06/28/2012     No Known Allergies  Past Medical History:   Diagnosis Date    Depression     ED (erectile dysfunction)     Hypertension      Social History     Tobacco Use    Smoking status: Never Smoker    Smokeless tobacco: Never Used   Substance Use Topics    Alcohol use: Yes     Alcohol/week: 1.0 standard drinks     Types: 1 Glasses of wine per week             Review of Systems  Pertinent items are noted in HPI. Objective:     Significant for the following:  Only mildly elevated BMI  Visit Vitals  /81 (BP 1 Location: Left arm, BP Patient Position: Sitting, BP Cuff Size: Adult)   Pulse 72   Temp 98.2 °F (36.8 °C) (Oral)   Resp 16   Ht 5' 9\" (1.753 m)   Wt 182 lb (82.6 kg)   SpO2 98%   BMI 26.88 kg/m²     WD WN male NAD  Heart RRR without murmers clicks or rubs  Lungs CTA  Abdo soft nontender  Ext no edema  Rash on torso, dermatomal distribution on the right front to back papules    Lab review: labs reviewed, I note that sugar still quite elevated. Assessment/Plan:     Follow-up diabetes poorly controlled, needs further observation, needs improvement, needs to follow diet more regularly. Diabetic issues reviewed with him: referral to Diabetic Education department, all medications, side effects and compliance discussed carefully, glycohemoglobin and other lab monitoring discussed and long term diabetic complications discussed. ICD-10-CM ICD-9-CM    1. Herpes zoster without complication  Z71.1 691.9 valACYclovir (VALTREX) 1 gram tablet   2. Type 2 diabetes mellitus with hyperglycemia, without long-term current use of insulin (HCC)  E11.65 250.00      790.29    3. Encounter for occupational physical examination  Z02.89 V70.5 losartan-hydroCHLOROthiazide (HYZAAR) 50-12.5 mg per tablet   4. Mild major depression, single episode (HCC)  F32.0 296.21 sertraline (ZOLOFT) 50 mg tablet   5. Rash  R21 782.1 mometasone (ELOCON) 0.1 % ointment       Orders Placed This Encounter    AMB SUPPLY ORDER     Nutrition, newly Dx Dm    AMB POC GLUCOSE BLOOD, BY GLUCOSE MONITORING DEVICE    valACYclovir (VALTREX) 1 gram tablet     Sig: Take 1 Tablet by mouth three (3) times daily for 7 days.      Dispense:  21 Tablet     Refill:  0    sertraline (ZOLOFT) 50 mg tablet     Sig: TAKE 1 TABLET BY MOUTH EVERY DAY     Dispense:  90 Tablet     Refill:  3    mometasone (ELOCON) 0.1 % ointment     Sig: Apply thin layer topically to affected areas     Dispense:  45 g     Refill:  5    DISCONTD: losartan-hydroCHLOROthiazide (HYZAAR) 50-12.5 mg per tablet     Sig: Take 1 Tablet by mouth daily. Dispense:  90 Tablet     Refill:  1     Appointment needed to refill this medication again, virtual or phone visit.  empagliflozin-metFORMIN (SYNJARDY) 5-500 mg per tablet     Sig: Take 1 Tablet by mouth two (2) times daily (with meals). Dispense:  60 Tablet     Refill:  5    losartan-hydroCHLOROthiazide (HYZAAR) 50-12.5 mg per tablet     Sig: Take 1 Tablet by mouth daily. Dispense:  90 Tablet     Refill:  1     Change diabetes medicine as above  Discussed possible side affects, precautions, and drug interactions and possible benefits of the medication(s).       As scheduled

## 2021-07-28 NOTE — PROGRESS NOTES
Chief Complaint   Patient presents with    Rash     raised red itching rash to upper R/arm and upper back x1 week     Patient has not been out of the country in (14 months), NO diarrhea, NO cough, NO chest conjestion, NO temp. Pt has not been around anyone with these symptoms. Health Maintenance reviewed. I have reviewed the patient's medical history in detail and updated the computerized patient record. 1. Have you been to the ER, urgent care clinic since your last visit? no   Hospitalized since your last visit? no     2. Have you seen or consulted any other health care providers outside of the [address] [place] [address] since your last visit? No  Include any pap smears or colon screening. Encouraged pt to discuss pt's wishes with spouse/partner/family and bring them in the next appt to follow thru with the Advanced Directive    @  [address], last 12 mths 7/8/2020   Able to walk? Yes   Fall in past 12 months? No       3 most recent PHQ Screens 7/28/2021   Little interest or pleasure in doing things Several days   Feeling down, depressed, irritable, or hopeless Several days   Total Score PHQ 2 2       Abuse Screening Questionnaire 7/28/2021   Do you ever feel afraid of your partner? N   Are you in a relationship with someone who physically or mentally threatens you? N   Is it safe for you to go home?  Y       ADL Assessment 7/28/2021   Feeding yourself No Help Needed   Getting from bed to chair No Help Needed   Getting dressed No Help Needed   Bathing or showering No Help Needed   Walk across the room (includes cane/walker) No Help Needed   Using the telphone No Help Needed   Taking your medications No Help Needed   Preparing meals No Help Needed   Managing money (expenses/bills) No Help Needed   Moderately strenuous housework (laundry) No Help Needed   Shopping for personal items (toiletries/medicines) No Help Needed   Shopping for groceries No Help Needed   Driving No Help Needed Climbing a flight of stairs No Help Needed   Getting to places beyond walking distances No Help Needed

## 2021-07-28 NOTE — PATIENT INSTRUCTIONS
Learning About Carbohydrate (Carb) Counting and Eating Out When You Have Diabetes  Why plan your meals? Meal planning can be a key part of managing diabetes. Planning meals and snacks with the right balance of carbohydrate, protein, and fat can help you keep your blood sugar at the target level you set with your doctor. You don't have to eat special foods. You can eat what your family eats, including sweets once in a while. But you do have to pay attention to how often you eat and how much you eat of certain foods. You may want to work with a dietitian or a certified diabetes educator. He or she can give you tips and meal ideas and can answer your questions about meal planning. This health professional can also help you reach a healthy weight if that is one of your goals. What should you know about eating carbs? Managing the amount of carbohydrate (carbs) you eat is an important part of healthy meals when you have diabetes. Carbohydrate is found in many foods. · Learn which foods have carbs. And learn the amounts of carbs in different foods. ? Bread, cereal, pasta, and rice have about 15 grams of carbs in a serving. A serving is 1 slice of bread (1 ounce), ½ cup of cooked cereal, or 1/3 cup of cooked pasta or rice. ? Fruits have 15 grams of carbs in a serving. A serving is 1 small fresh fruit, such as an apple or orange; ½ of a banana; ½ cup of cooked or canned fruit; ½ cup of fruit juice; 1 cup of melon or raspberries; or 2 tablespoons of dried fruit. ? Milk and no-sugar-added yogurt have 15 grams of carbs in a serving. A serving is 1 cup of milk or 2/3 cup of no-sugar-added yogurt. ? Starchy vegetables have 15 grams of carbs in a serving. A serving is ½ cup of mashed potatoes or sweet potato; 1 cup winter squash; ½ of a small baked potato; ½ cup of cooked beans; or ½ cup cooked corn or green peas.   · Learn how much carbs to eat each day and at each meal. A dietitian or CDE can teach you how to keep track of the amount of carbs you eat. This is called carbohydrate counting. · If you are not sure how to count carbohydrate grams, use the Plate Method to plan meals. It is a good, quick way to make sure that you have a balanced meal. It also helps you spread carbs throughout the day. ? Divide your plate by types of foods. Put non-starchy vegetables on half the plate, meat or other protein food on one-quarter of the plate, and a grain or starchy vegetable in the final quarter of the plate. To this you can add a small piece of fruit and 1 cup of milk or yogurt, depending on how many carbs you are supposed to eat at a meal.  · Try to eat about the same amount of carbs at each meal. Do not \"save up\" your daily allowance of carbs to eat at one meal.  · Proteins have very little or no carbs per serving. Examples of proteins are beef, chicken, turkey, fish, eggs, tofu, cheese, cottage cheese, and peanut butter. A serving size of meat is 3 ounces, which is about the size of a deck of cards. Examples of meat substitute serving sizes (equal to 1 ounce of meat) are 1/4 cup of cottage cheese, 1 egg, 1 tablespoon of peanut butter, and ½ cup of tofu. How can you eat out and still eat healthy? · Learn to estimate the serving sizes of foods that have carbohydrate. If you measure food at home, it will be easier to estimate the amount in a serving of restaurant food. · If the meal you order has too much carbohydrate (such as potatoes, corn, or baked beans), ask to have a low-carbohydrate food instead. Ask for a salad or green vegetables. · If you use insulin, check your blood sugar before and after eating out to help you plan how much to eat in the future. · If you eat more carbohydrate at a meal than you had planned, take a walk or do other exercise. This will help lower your blood sugar. What are some tips for eating healthy? · Limit saturated fat, such as the fat from meat and dairy products.  This is a healthy choice because people who have diabetes are at higher risk of heart disease. So choose lean cuts of meat and nonfat or low-fat dairy products. Use olive or canola oil instead of butter or shortening when cooking. · Don't skip meals. Your blood sugar may drop too low if you skip meals and take insulin or certain medicines for diabetes. · Check with your doctor before you drink alcohol. Alcohol can cause your blood sugar to drop too low. Alcohol can also cause a bad reaction if you take certain diabetes medicines. Follow-up care is a key part of your treatment and safety. Be sure to make and go to all appointments, and call your doctor if you are having problems. It's also a good idea to know your test results and keep a list of the medicines you take. Where can you learn more? Go to http://www.gillette.com/  Enter I147 in the search box to learn more about \"Learning About Carbohydrate (Carb) Counting and Eating Out When You Have Diabetes. \"  Current as of: August 31, 2020               Content Version: 12.8  © 2006-2021 [employer]. Care instructions adapted under license by [employer] (which disclaims liability or warranty for this information). If you have questions about a medical condition or this instruction, always ask your healthcare professional. [address] any warranty or liability for your use of this information. Nutrition Tips for Diabetes: After Your Visit  Your Care Instructions  A healthy diet is important to manage diabetes. It helps you lose weight (if you need to) and keep it off. It gives you the nutrition and energy your body needs and helps prevent heart disease. But a diet for diabetes does not mean that you have to eat special foods. You can eat what your family eats, including occasional sweets and other favorites. But you do have to pay attention to how often you eat and how much you eat of certain foods.  The right plan for you will give you meals that help you keep your blood sugar at healthy levels. Try to eat a variety of foods and to spread carbohydrate throughout the day. Carbohydrate raises blood sugar higher and more quickly than any other nutrient does. Carbohydrate is found in sugar, breads and cereals, fruit, starchy vegetables such as potatoes and corn, and milk and yogurt. You may want to work with a dietitian or diabetes educator to help you plan meals and snacks. A dietitian or diabetes educator also can help you lose weight if that is one of your goals. The following tips can help you enjoy your meals and stay healthy. Follow-up care is a key part of your treatment and safety. Be sure to make and go to all appointments, and call your doctor if you are having problems. Its also a good idea to know your test results and keep a list of the medicines you take. How can you care for yourself at home? · Learn which foods have carbohydrate and how much carbohydrate to eat. A dietitian or diabetes educator can help you learn to keep track of how much carbohydrate you eat. · Spread carbohydrate throughout the day. Eat some carbohydrate at all meals, but do not eat too much at any one time. · Plan meals to include food from all the food groups. These are the food groups and some example portion sizes:  ¨ Grains: 1 slice of bread (1 ounce), ½ cup of cooked cereal, and 1/3 cup of cooked pasta or rice. These have about 15 grams of carbohydrate in a serving. Choose whole grains such as whole wheat bread or crackers, oatmeal, and brown rice more often than refined grains. ¨ Fruit: 1 small fresh fruit, such as an apple or orange; ½ of a banana; ½ cup of chopped, cooked, or canned fruit; ½ cup of fruit juice; 1 cup of melon or raspberries; and 2 tablespoons of dried fruit. These have about 15 grams of carbohydrate in a serving. ¨ Dairy: 1 cup of nonfat or low-fat milk and 2/3 cup of plain yogurt.  These have about 15 grams of carbohydrate in a serving. ¨ Protein foods: Beef, chicken, turkey, fish, eggs, tofu, cheese, cottage cheese, and peanut butter. A serving size of meat is 3 ounces, which is about the size of a deck of cards. Examples of meat substitute serving sizes (equal to 1 ounce of meat) are 1/4 cup of cottage cheese, 1 egg, 1 tablespoon of peanut butter, and ½ cup of tofu. These have very little or no carbohydrate per serving. ¨ Vegetables: Starchy vegetables such as ½ cup of cooked dried beans, peas, potatoes, or corn have about 15 grams of carbohydrate. Nonstarchy vegetables have very little carbohydrate, such as 1 cup of raw leafy vegetables (such as spinach), ½ cup of other vegetables (cooked or chopped), and 3/4 cup of vegetable juice. · Use the plate format to plan meals. It is a good, quick way to make sure that you have a balanced meal. It also helps you spread carbohydrate throughout the day. You divide your plate by types of foods. Put vegetables on half the plate, meat or meat substitutes on one-quarter of the plate, and a grain or starchy vegetable (such as brown rice or a potato) in the final quarter of the plate. To this you can add a small piece of fruit and 1 cup of milk or yogurt, depending on how much carbohydrate you are supposed to eat at a meal.  · Talk to your dietitian or diabetes educator about ways to add limited amounts of sweets into your meal plan. You can eat these foods now and then, as long as you include the amount of carbohydrate they have in your daily carbohydrate allowance. · If you drink alcohol, limit it to no more than 1 drink a day for women and 2 drinks a day for men. If you are pregnant, no amount of alcohol is known to be safe. · Protein, fat, and fiber do not raise blood sugar as much as carbohydrate does. If you eat a lot of these nutrients in a meal, your blood sugar will rise more slowly than it would otherwise.   · Limit saturated fats, such as those from meat and dairy products. Try to replace it with monounsaturated fat, such as olive oil. This is a healthier choice because people who have diabetes are at higher-than-average risk of heart disease. But use a modest amount of olive oil. A tablespoon of olive oil has 14 grams of fat and 120 calories. · Exercise lowers blood sugar. If you take insulin by shots or pump, you can use less than you would if you were not exercising. Keep in mind that timing matters. If you exercise within 1 hour after a meal, your body may need less insulin for that meal than it would if you exercised 3 hours after the meal. Test your blood sugar to find out how exercise affects your need for insulin. · Exercise on most days of the week. Aim for at least 30 minutes. Exercise helps you stay at a healthy weight and helps your body use insulin. Walking is an easy way to get exercise. Gradually increase the amount you walk every day. You also may want to swim, bike, or do other activities. When you eat out  · Learn to estimate the serving sizes of foods that have carbohydrate. If you measure food at home, it will be easier to estimate the amount in a serving of restaurant food. · If the meal you order has too much carbohydrate (such as potatoes, corn, or baked beans), ask to have a low-carbohydrate food instead. Ask for a salad or green vegetables. · If you use insulin, check your blood sugar before and after eating out to help you plan how much to eat in the future. · If you eat more carbohydrate at a meal than you had planned, take a walk or do other exercise. This will help lower your blood sugar. Where can you learn more? Go to [employer]  Enter U945 in the search box to learn more about \"Nutrition Tips for Diabetes: After Your Visit. \"   © 9718-1243 Healthwise, Incorporated. Care instructions adapted under license by [employer] (which disclaims liability or warranty for this information).  This care instruction is for use with your licensed healthcare professional. If you have questions about a medical condition or this instruction, always ask your healthcare professional. [place] [address] any warranty or liability for your use of this information. Content Version: 95.6.849494; Current as of: June 4, 2014                 Shingles: Care Instructions  Your Care Instructions     Shingles (herpes zoster) causes pain and a blistered rash. The rash can appear anywhere on the body but will be on only one side of the body, the left or right. It will be in a band, a strip, or a small area. The pain can be very severe. Shingles can also cause tingling or itching in the area of the rash. The blisters scab over after a few days and heal in 2 to 4 weeks. Medicines can help you feel better and may help prevent more serious problems caused by shingles. Shingles is caused by the same virus that causes chickenpox. When you have chickenpox, the virus gets into your nerve roots and stays there (becomes dormant) long after you get over the chickenpox. If the virus becomes active again, it can cause shingles. Follow-up care is a key part of your treatment and safety. Be sure to make and go to all appointments, and call your doctor if you are having problems. It's also a good idea to know your test results and keep a list of the medicines you take. How can you care for yourself at home? · Be safe with medicines. Take your medicines exactly as prescribed. Call your doctor if you think you are having a problem with your medicine. Antiviral medicine helps you get better faster. · Try not to scratch or pick at the blisters. They will crust over and fall off on their own if you leave them alone. · Put cool, wet cloths on the area to relieve pain and itching. You can also use calamine lotion. Try not to use so much lotion that it cakes and is hard to get off.   · Put cornstarch or baking soda on the sores to help dry them out so they heal faster. · Do not use thick ointment, such as petroleum jelly, on the sores. This will keep them from drying and healing. · To help remove loose crusts, soak them in tap water. This can help decrease oozing, and dry and soothe the skin. · Take an over-the-counter pain medicine, such as acetaminophen (Tylenol), ibuprofen (Advil, Motrin), or naproxen (Aleve). Read and follow all instructions on the label. · Avoid close contact with people until the blisters have healed. It is very important for you to avoid contact with anyone who has never had chickenpox or the chickenpox vaccine. Pregnant women, young babies, and anyone else who has a hard time fighting infection (such as someone with HIV, diabetes, or cancer) is especially at risk. When should you call for help? Call your doctor now or seek immediate medical care if:    · You have a new or higher fever.     · You have a severe headache and a stiff neck.     · You lose the ability to think clearly.     · The rash spreads to your forehead, nose, eyes, or eyelids.     · You have eye pain, or your vision gets worse.     · You have new pain in your face, or you cannot move the muscles in your face.     · Blisters spread to new parts of your body. Watch closely for changes in your health, and be sure to contact your doctor if:    · The rash has not healed after 2 to 4 weeks.     · You still have pain after the rash has healed. Where can you learn more? Go to http://www.gray.com/  Enter X176 in the search box to learn more about \"Shingles: Care Instructions. \"  Current as of: September 23, 2020               Content Version: 12.8  © 4159-1619 [employer]. Care instructions adapted under license by [employer] (which disclaims liability or warranty for this information).  If you have questions about a medical condition or this instruction, always ask your healthcare professional. [employer] disclaims any warranty or liability for your use of this information. Learning About Certified Diabetes Educators  What is a certified diabetes educator? Certified diabetes educators are health professionals who have special training to help you manage your diabetes. They may be:  · Registered nurses. · Registered dietitians. · Pharmacists. · Social workers. · Doctors. Your diabetes educator will give you tips and help with daily diabetes care. He or she also may teach classes. These classes give you a chance to learn from and connect with others who have diabetes. Why see a diabetes educator? Your doctor wants you to get the personal support and help that a diabetes educator can give. And most insurance plans will cover part or all of the cost.  Learning is a key part of living with diabetes. A diabetes educator teaches about the most important parts of your care. You will learn about:  · Eating healthy meals. · Being active. · Taking medicine. · Checking your blood sugar. · Dealing with your feelings about having diabetes. He or she can help you find ways to live better with diabetes. And your diabetes educator can show you small changes that can make a big difference in your daily routine and your health. If you need to make a big change, he or she will be able to answer your questions and guide you though each step. When should you see one? It can be helpful to see a diabetes educator at certain points in your care. He or she can:  · Get you started when you're first diagnosed with diabetes. · Check in once a year for a review of your health and daily routine. · Show you how to handle a new health problem along with your diabetes. · Help you work with a new health care team.  Follow-up care is a key part of your treatment and safety. Be sure to make and go to all appointments, and call your doctor if you are having problems.  It's also a good idea to know your test results and keep a list of the medicines you take. Where can you learn more? Go to http://www.gray.com/  Enter E785 in the search box to learn more about \"Learning About Certified Diabetes Educators. \"  Current as of: August 31, 2020               Content Version: 12.8  © 5822-5856 Healthwise, Incorporated. Care instructions adapted under license by [employer] (which disclaims liability or warranty for this information). If you have questions about a medical condition or this instruction, always ask your healthcare professional. [place] [address] any warranty or liability for your use of this information.

## 2021-08-01 RX ORDER — LOSARTAN POTASSIUM AND HYDROCHLOROTHIAZIDE 12.5; 5 MG/1; MG/1
1 TABLET ORAL DAILY
Qty: 90 TABLET | Refills: 1 | Status: SHIPPED | OUTPATIENT
Start: 2021-08-01 | End: 2022-03-22 | Stop reason: SDUPTHER

## 2021-08-11 LAB
ALBUMIN SERPL-MCNC: 4.3 G/DL (ref 3.8–4.9)
ALBUMIN/GLOB SERPL: 1.5 {RATIO} (ref 1.2–2.2)
ALP SERPL-CCNC: 111 IU/L (ref 48–121)
ALT SERPL-CCNC: 30 IU/L (ref 0–44)
AST SERPL-CCNC: 17 IU/L (ref 0–40)
BILIRUB SERPL-MCNC: 0.6 MG/DL (ref 0–1.2)
BUN SERPL-MCNC: 16 MG/DL (ref 6–24)
BUN/CREAT SERPL: 18 (ref 9–20)
CALCIUM SERPL-MCNC: 9.2 MG/DL (ref 8.7–10.2)
CHLORIDE SERPL-SCNC: 103 MMOL/L (ref 96–106)
CHOLEST SERPL-MCNC: 183 MG/DL (ref 100–199)
CO2 SERPL-SCNC: 24 MMOL/L (ref 20–29)
CREAT SERPL-MCNC: 0.9 MG/DL (ref 0.76–1.27)
EST. AVERAGE GLUCOSE BLD GHB EST-MCNC: 312 MG/DL
GLOBULIN SER CALC-MCNC: 2.9 G/DL (ref 1.5–4.5)
GLUCOSE SERPL-MCNC: 180 MG/DL (ref 65–99)
HBA1C MFR BLD: 12.5 % (ref 4.8–5.6)
HDLC SERPL-MCNC: 40 MG/DL
IMP & REVIEW OF LAB RESULTS: NORMAL
LDLC SERPL CALC-MCNC: 121 MG/DL (ref 0–99)
POTASSIUM SERPL-SCNC: 4 MMOL/L (ref 3.5–5.2)
PROT SERPL-MCNC: 7.2 G/DL (ref 6–8.5)
SODIUM SERPL-SCNC: 140 MMOL/L (ref 134–144)
TRIGL SERPL-MCNC: 119 MG/DL (ref 0–149)
VLDLC SERPL CALC-MCNC: 22 MG/DL (ref 5–40)

## 2021-08-13 ENCOUNTER — OFFICE VISIT (OUTPATIENT)
Dept: INTERNAL MEDICINE CLINIC | Age: 51
End: 2021-08-13
Payer: COMMERCIAL

## 2021-08-13 VITALS
HEIGHT: 69 IN | RESPIRATION RATE: 16 BRPM | WEIGHT: 179 LBS | HEART RATE: 69 BPM | DIASTOLIC BLOOD PRESSURE: 76 MMHG | TEMPERATURE: 98.1 F | SYSTOLIC BLOOD PRESSURE: 131 MMHG | BODY MASS INDEX: 26.51 KG/M2 | OXYGEN SATURATION: 98 %

## 2021-08-13 DIAGNOSIS — E11.65 TYPE 2 DIABETES MELLITUS WITH HYPERGLYCEMIA, WITHOUT LONG-TERM CURRENT USE OF INSULIN (HCC): Primary | ICD-10-CM

## 2021-08-13 DIAGNOSIS — Z12.11 SCREENING FOR COLON CANCER: ICD-10-CM

## 2021-08-13 DIAGNOSIS — E78.5 DYSLIPIDEMIA: ICD-10-CM

## 2021-08-13 DIAGNOSIS — I10 ESSENTIAL HYPERTENSION: ICD-10-CM

## 2021-08-13 LAB — GLUCOSE POC: 179 MG/DL

## 2021-08-13 PROCEDURE — 82962 GLUCOSE BLOOD TEST: CPT | Performed by: FAMILY MEDICINE

## 2021-08-13 PROCEDURE — 99214 OFFICE O/P EST MOD 30 MIN: CPT | Performed by: FAMILY MEDICINE

## 2021-08-13 RX ORDER — IBUPROFEN 200 MG
CAPSULE ORAL
Qty: 50 STRIP | Refills: 1 | Status: SHIPPED | OUTPATIENT
Start: 2021-08-13 | End: 2021-12-23

## 2021-08-13 RX ORDER — INSULIN PUMP SYRINGE, 3 ML
EACH MISCELLANEOUS
Qty: 1 KIT | Refills: 0 | Status: SHIPPED | OUTPATIENT
Start: 2021-08-13

## 2021-08-13 NOTE — PROGRESS NOTES
Subjective:     Tim Waldron is a 46 y.o. male seen for follow-up of diabetes. He has had hypoglycemic attacks. .no  Blood sugar control has been better, today's fasting blood sugar is 179. Lab Results   Component Value Date/Time    Hemoglobin A1c 12.5 (H) 08/10/2021 08:25 AM    Hemoglobin A1c 5.8 (H) 01/08/2018 08:59 AM    Hemoglobin A1c 6.0 (H) 07/12/2017 09:39 AM    Glucose 180 (H) 08/10/2021 08:25 AM    Glucose  08/13/2021 08:22 AM    Microalb/Creat ratio (ug/mg creat.) 4.1 11/05/2014 08:22 AM    LDL, calculated 121 (H) 08/10/2021 08:25 AM    LDL, calculated 126 (H) 07/01/2020 07:52 AM    Creatinine 0.90 08/10/2021 08:25 AM       He has diabetes and hypertension. Tim Waldron has the additional concern of no issues with synjardy, CDL found sugar in urine    Reports taking blood pressure medications without side affects. No complaints of exertional chest pain, excessive shortness of breath or focal weakness. Minimal swelling in lower legs or dizziness with standing. Diet and Lifestyle: follows a diabetic diet regularly. Patient Active Problem List    Diagnosis Date Noted    Hyperglycemia due to type 2 diabetes mellitus ([facility] [address] 08/13/2021    Screening for colon cancer 08/13/2021    Conductive hearing loss 06/24/2020    Mild major depression, single episode ([address] 06/06/2019    Hypertension 06/28/2012    Atopic dermatitis 06/28/2012    Erectile dysfunction 06/28/2012    Dyslipidemia 06/28/2012       No Known Allergies  Past Medical History:   Diagnosis Date    Depression     ED (erectile dysfunction)     Hypertension      Social History     Tobacco Use    Smoking status: Never Smoker    Smokeless tobacco: Never Used   Substance Use Topics    Alcohol use: Yes     Alcohol/week: 1.0 standard drinks     Types: 1 Glasses of wine per week             Review of Systems  Pertinent items are noted in HPI. Objective:     Significant for the following:  WNL  Visit Vitals  /76 (BP 1 Location: Left arm, BP Patient Position: Sitting, BP Cuff Size: Adult)   Pulse 69   Temp 98.1 °F (36.7 °C) (Oral)   Resp 16   Ht 5' 9\" (1.753 m)   Wt 179 lb (81.2 kg)   SpO2 98%   BMI 26.43 kg/m²     WD WN male NAD  Heart RRR without murmers clicks or rubs  Lungs CTA  Abdo soft nontender  Ext no edema  Diabetic foot exam was performed. No obvious sores or red lesions. Sensation checked by monofilament exam which was normal.  Dorsalis pedis pulse wasnl. Lab review: labs reviewed, I note that glu much better. Assessment/Plan:     Follow-up diabetes well controlled, stable. Diabetic issues reviewed with him: all medications, side effects and compliance discussed carefully, foot care discussed and Podiatry visits discussed, annual eye examinations at Ophthalmology discussed, glycohemoglobin and other lab monitoring discussed and labs immediately prior to next visit. ICD-10-CM ICD-9-CM    1. Controlled type 2 diabetes mellitus with complication, without long-term current use of insulin ([facility])  E11.8 250.90 AMB POC GLUCOSE BLOOD, BY GLUCOSE MONITORING DEVICE       Orders Placed This Encounter    AMB POC GLUCOSE BLOOD, BY GLUCOSE MONITORING DEVICE             Chronic Conditions Addressed Today     1. Screening for colon cancer     Relevant Orders     REFERRAL TO GASTROENTEROLOGY    2. Hypertension     Relevant Orders     METABOLIC PANEL, BASIC    3. Hyperglycemia due to type 2 diabetes mellitus ([facility] [address]    4.  Dyslipidemia      Acute Diagnoses Addressed Today     Controlled type 2 diabetes mellitus with complication, without long-term current use of insulin (HCC)    -  Primary        Relevant Orders        AMB POC GLUCOSE BLOOD, BY GLUCOSE MONITORING DEVICE (Completed)        HEMOGLOBIN A1C WITH EAG        He wants a blood sugar meter, okay  Diabetes stable continue current medications    Current Outpatient Medications   Medication Sig Dispense Refill    Blood-Glucose Meter monitoring kit Whichever Ins will cover 1 Kit 0    glucose blood VI test strips (blood glucose test) strip Daily as needed 50 Strip 1    losartan-hydroCHLOROthiazide (HYZAAR) 50-12.5 mg per tablet Take 1 Tablet by mouth daily. 90 Tablet 1    sertraline (ZOLOFT) 50 mg tablet TAKE 1 TABLET BY MOUTH EVERY DAY 90 Tablet 3    mometasone (ELOCON) 0.1 % ointment Apply thin layer topically to affected areas 45 g 5    empagliflozin-metFORMIN (SYNJARDY) 5-500 mg per tablet Take 1 Tablet by mouth two (2) times daily (with meals). 60 Tablet 5    sildenafil citrate (VIAGRA) 50 mg tablet TAKE 1 TABLET BY MOUTH AS NEEDED 5 Tablet 5    fluticasone (FLONASE) 50 mcg/actuation nasal spray 2 Sprays by Both Nostrils route daily. 1 Bottle 5     Follow-up and Dispositions    · Return in about 15 weeks (around 11/26/2021) for routine follow up.

## 2021-08-13 NOTE — PROGRESS NOTES
Chief Complaint   Patient presents with    Diabetes     Patient has not been out of the country in (14 months), NO diarrhea, NO cough, NO chest conjestion, NO temp. Pt has not been around anyone with these symptoms. Health Maintenance reviewed. I have reviewed the patient's medical history in detail and updated the computerized patient record. 1. Have you been to the ER, urgent care clinic since your last visit? No  Hospitalized since your last visit?  no    2. Have you seen or consulted any other health care providers outside of the [address] [place] [address] since your last visit? No   Include any pap smears or colon screening. Encouraged pt to discuss pt's wishes with spouse/partner/family and bring them in the next appt to follow thru with the Advanced Directive    @  [address] [facility] [address] [address] [address], last 12 mths 7/8/2020   Able to walk? Yes   Fall in past 12 months? No       3 most recent PHQ Screens 8/13/2021   Little interest or pleasure in doing things Several days   Feeling down, depressed, irritable, or hopeless Several days   Total Score PHQ 2 2       Abuse Screening Questionnaire 8/13/2021   Do you ever feel afraid of your partner? N   Are you in a relationship with someone who physically or mentally threatens you? N   Is it safe for you to go home?  Y       ADL Assessment 8/13/2021   Feeding yourself No Help Needed   Getting from bed to chair No Help Needed   Getting dressed No Help Needed   Bathing or showering No Help Needed   Walk across the room (includes cane/walker) No Help Needed   Using the telphone No Help Needed   Taking your medications No Help Needed   Preparing meals No Help Needed   Managing money (expenses/bills) No Help Needed   Moderately strenuous housework (laundry) No Help Needed   Shopping for personal items (toiletries/medicines) No Help Needed   Shopping for groceries No Help Needed   Driving No Help Needed   Climbing a flight of stairs No Help Needed   Getting to places beyond walking distances No Help Needed

## 2021-08-13 NOTE — PATIENT INSTRUCTIONS
Learning About Certified Diabetes Educators  What is a certified diabetes educator? Certified diabetes educators are health professionals who have special training to help you manage your diabetes. They may be:  · Registered nurses. · Registered dietitians. · Pharmacists. · Social workers. · Doctors. Your diabetes educator will give you tips and help with daily diabetes care. He or she also may teach classes. These classes give you a chance to learn from and connect with others who have diabetes. Why see a diabetes educator? Your doctor wants you to get the personal support and help that a diabetes educator can give. And most insurance plans will cover part or all of the cost.  Learning is a key part of living with diabetes. A diabetes educator teaches about the most important parts of your care. You will learn about:  · Eating healthy meals. · Being active. · Taking medicine. · Checking your blood sugar. · Dealing with your feelings about having diabetes. He or she can help you find ways to live better with diabetes. And your diabetes educator can show you small changes that can make a big difference in your daily routine and your health. If you need to make a big change, he or she will be able to answer your questions and guide you though each step. When should you see one? It can be helpful to see a diabetes educator at certain points in your care. He or she can:  · Get you started when you're first diagnosed with diabetes. · Check in once a year for a review of your health and daily routine. · Show you how to handle a new health problem along with your diabetes. · Help you work with a new health care team.  Follow-up care is a key part of your treatment and safety. Be sure to make and go to all appointments, and call your doctor if you are having problems. It's also a good idea to know your test results and keep a list of the medicines you take. Where can you learn more?   Go to http://www.gray.com/  Enter R594 in the search box to learn more about \"Learning About Certified Diabetes Educators. \"  Current as of: August 31, 2020               Content Version: 12.8  © 3455-9169 Healthwise, Incorporated. Care instructions adapted under license by [employer] (which disclaims liability or warranty for this information). If you have questions about a medical condition or this instruction, always ask your healthcare professional. [place] [address] any warranty or liability for your use of this information.

## 2021-08-30 ENCOUNTER — OFFICE VISIT (OUTPATIENT)
Dept: INTERNAL MEDICINE CLINIC | Age: 51
End: 2021-08-30
Payer: COMMERCIAL

## 2021-08-30 VITALS
BODY MASS INDEX: 26.66 KG/M2 | WEIGHT: 180 LBS | OXYGEN SATURATION: 98 % | SYSTOLIC BLOOD PRESSURE: 129 MMHG | TEMPERATURE: 98.5 F | HEIGHT: 69 IN | RESPIRATION RATE: 16 BRPM | HEART RATE: 68 BPM | DIASTOLIC BLOOD PRESSURE: 83 MMHG

## 2021-08-30 DIAGNOSIS — E11.65 TYPE 2 DIABETES MELLITUS WITH HYPERGLYCEMIA, WITHOUT LONG-TERM CURRENT USE OF INSULIN (HCC): ICD-10-CM

## 2021-08-30 DIAGNOSIS — I10 ESSENTIAL HYPERTENSION: ICD-10-CM

## 2021-08-30 DIAGNOSIS — Z02.89 ENCOUNTER FOR OCCUPATIONAL PHYSICAL EXAMINATION: Primary | ICD-10-CM

## 2021-08-30 LAB
BILIRUB UR QL STRIP: NEGATIVE
GLUCOSE POC: 140 MG/DL
GLUCOSE UR-MCNC: NORMAL MG/DL
KETONES P FAST UR STRIP-MCNC: NEGATIVE MG/DL
PH UR STRIP: 5.5 [PH] (ref 4.6–8)
PROT UR QL STRIP: NEGATIVE
SP GR UR STRIP: 1.02 (ref 1–1.03)
UA UROBILINOGEN AMB POC: NORMAL (ref 0.2–1)
URINALYSIS CLARITY POC: CLEAR
URINALYSIS COLOR POC: NORMAL
URINE BLOOD POC: NORMAL
URINE LEUKOCYTES POC: NEGATIVE
URINE NITRITES POC: NEGATIVE

## 2021-08-30 PROCEDURE — 99214 OFFICE O/P EST MOD 30 MIN: CPT | Performed by: FAMILY MEDICINE

## 2021-08-30 PROCEDURE — 82962 GLUCOSE BLOOD TEST: CPT | Performed by: FAMILY MEDICINE

## 2021-08-30 PROCEDURE — 81003 URINALYSIS AUTO W/O SCOPE: CPT | Performed by: FAMILY MEDICINE

## 2021-08-30 NOTE — PROGRESS NOTES
Chief Complaint   Patient presents with    Diabetes     3 mth FU for DOT     Patient has not been out of the country in (14 months), NO diarrhea, NO cough, NO chest conjestion, NO temp. Pt has not been around anyone with these symptoms. Health Maintenance reviewed. I have reviewed the patient's medical history in detail and updated the computerized patient record. 1. Have you been to the ER, urgent care clinic since your last visit? no  Hospitalized since your last visit?  no    2. Have you seen or consulted any other health care providers outside of the [address] [place] [address] since your last visit? No    Include any pap smears or colon screening. Encouraged pt to discuss pt's wishes with spouse/partner/family and bring them in the next appt to follow thru with the Advanced Directive    @  [address] [facility] [address], last 12 mths 7/8/2020   Able to walk? Yes   Fall in past 12 months? No       3 most recent PHQ Screens 8/30/2021   Little interest or pleasure in doing things Several days   Feeling down, depressed, irritable, or hopeless Several days   Total Score PHQ 2 2       Abuse Screening Questionnaire 8/30/2021   Do you ever feel afraid of your partner? N   Are you in a relationship with someone who physically or mentally threatens you? N   Is it safe for you to go home?  Y       ADL Assessment 8/30/2021   Feeding yourself No Help Needed   Getting from bed to chair No Help Needed   Getting dressed No Help Needed   Bathing or showering No Help Needed   Walk across the room (includes cane/walker) No Help Needed   Using the telphone No Help Needed   Taking your medications No Help Needed   Preparing meals No Help Needed   Managing money (expenses/bills) No Help Needed   Moderately strenuous housework (laundry) No Help Needed   Shopping for personal items (toiletries/medicines) No Help Needed   Shopping for groceries No Help Needed   Driving No Help Needed   Climbing a flight of stairs No Help Needed Getting to places beyond walking distances No Help Needed

## 2021-08-30 NOTE — PROGRESS NOTES
DOT PHYSICAL    Sharlene Hassan 46 y.o. presents for a DOT physical.  He has the following concerns: none A1C was 12.5 in [place] but now better on DM meds  No dizziness, syncope or fainting, exertional chest pain, excessive shortness of breath, focal weakness, abdominal pain, severe depressed mood, thoughts of suicide, recreational drug abuse, excessive alcohol usage. No excessive sleepiness in the day time falling asleep at the wheel or any motor vehicle accidents. No diabetes. No insulin or narcotic agents. Reports taking blood pressure DM medications without side affects. No complaints of exertional chest pain, excessive shortness of breath or focal weakness. Minimal swelling in lower legs or dizziness with standing. Patient Active Problem List   Diagnosis Code    Hypertension I10    Atopic dermatitis L20.9    Erectile dysfunction N52.9    Dyslipidemia E78.5    Mild major depression, single episode ([address] F32.0    Conductive hearing loss H90.2    Hyperglycemia due to type 2 diabetes mellitus ([address] E11.65    Screening for colon cancer Z12.11     Past Medical History:   Diagnosis Date    Depression     ED (erectile dysfunction)     Hypertension      History reviewed. No pertinent surgical history. Social History     Socioeconomic History    Marital status:      Spouse name: Not on file    Number of children: 2    Years of education: Not on file    Highest education level: Not on file   Occupational History    Occupation: gas line   Tobacco Use    Smoking status: Never Smoker    Smokeless tobacco: Never Used   Substance and Sexual Activity    Alcohol use:  Yes     Alcohol/week: 1.0 standard drinks     Types: 1 Glasses of wine per week    Drug use: No    Sexual activity: Yes     Partners: Female   Other Topics Concern    Not on file   Social History Narrative    Lives with wife and 1 child 1 grown     Social Determinants of Health Financial Resource Strain:     Difficulty of Paying Living Expenses:    Food Insecurity:     Worried About Running Out of Food in the Last Year:     [address] in the Last Year:    Transportation Needs:     Lack of Transportation (Medical):  Lack of Transportation (Non-Medical):    Physical Activity:     Days of Exercise per Week:     Minutes of Exercise per Session:    Stress:     Feeling of Stress :    Social Connections:     Frequency of Communication with Friends and Family:     Frequency of Social Gatherings with Friends and Family:     Attends Taoism Services:     Active Member of Clubs or Organizations:     Attends Club or Organization Meetings:     Marital Status:    Intimate Partner Violence:     Fear of Current or Ex-Partner:     Emotionally Abused:     Physically Abused:     Sexually Abused:        Current Outpatient Medications   Medication Sig Dispense Refill    Blood-Glucose Meter monitoring kit Whichever Ins will cover 1 Kit 0    glucose blood VI test strips (blood glucose test) strip Daily as needed 50 Strip 1    losartan-hydroCHLOROthiazide (HYZAAR) 50-12.5 mg per tablet Take 1 Tablet by mouth daily. 90 Tablet 1    sertraline (ZOLOFT) 50 mg tablet TAKE 1 TABLET BY MOUTH EVERY DAY 90 Tablet 3    mometasone (ELOCON) 0.1 % ointment Apply thin layer topically to affected areas 45 g 5    empagliflozin-metFORMIN (SYNJARDY) 5-500 mg per tablet Take 1 Tablet by mouth two (2) times daily (with meals). 60 Tablet 5    sildenafil citrate (VIAGRA) 50 mg tablet TAKE 1 TABLET BY MOUTH AS NEEDED 5 Tablet 5    fluticasone (FLONASE) 50 mcg/actuation nasal spray 2 Sprays by Both Nostrils route daily. 1 Bottle 5         Results for orders placed or performed in visit on 08/13/21   AMB POC GLUCOSE BLOOD, BY GLUCOSE MONITORING DEVICE   Result Value Ref Range    Glucose  MG/DL         ROS: 12 point system revived,please see HPI for pertinent positives.       OBJECTIVE: Visit Vitals  /83 (BP 1 Location: Left arm, BP Patient Position: Sitting, BP Cuff Size: Adult)   Pulse 68   Temp 98.5 °F (36.9 °C) (Temporal)   Resp 16   Ht 5' 9\" (1.753 m)   Wt 180 lb (81.6 kg)   SpO2 98%   BMI 26.58 kg/m²       Vision meet standards and hearing test good. Hearing Screening    125Hz 250Hz 500Hz 1000Hz 2000Hz 3000Hz 4000Hz 6000Hz 8000Hz   Right ear:            Left ear:            Comments: Hearing L/R &gt;5 feet       Visual Acuity Screening    Right eye Left eye Both eyes   Without correction: 20/20 20/20 20/20   With correction:             Physical Examination:    General appearance - alert, well appearing, and in no distress. Psy- Mood and Affect WNL      ASSESSMENT/PLAN       ICD-10-CM ICD-9-CM    1. Encounter for occupational physical examination  Z02.89 V70.5 AMB POC URINALYSIS DIP STICK AUTO W/O MICRO      AMB POC GLUCOSE BLOOD, BY GLUCOSE MONITORING DEVICE   2. Type 2 diabetes mellitus with hyperglycemia, without long-term current use of insulin ([facility])  E11.65 250.00 MICROALBUMIN, UR, RAND W/ MICROALB/CREAT RATIO     790.29      Orders Placed This Encounter    MICROALBUMIN, UR, RAND W/ MICROALB/CREAT RATIO     Standing Status:   Future     Standing Expiration Date:   8/30/2022    AMB POC URINALYSIS DIP STICK AUTO W/O MICRO    AMB POC GLUCOSE BLOOD, BY GLUCOSE MONITORING DEVICE         Healthy appearing person. Advised routine care with PCP. 1 yr DOT card with no restrictions given. DOT papers kept in file and [facility] web site updated. I have discussed the diagnosis with the patient and the intended plan as seen in the above orders. The patient has received an after-visit summary and questions were answered concerning future plans. I have discussed medication side effects and warnings with the patient as well. Pt verbalizes understanding. Subjective:     Melvin Trammell is a 46 y.o. male seen for follow-up of diabetes. He has had hypoglycemic attacks. .no  Blood sugar control has been much better    Lab Results   Component Value Date/Time    Hemoglobin A1c 12.5 (H) 08/10/2021 08:25 AM    Hemoglobin A1c 5.8 (H) 01/08/2018 08:59 AM    Hemoglobin A1c 6.0 (H) 07/12/2017 09:39 AM    Glucose 180 (H) 08/10/2021 08:25 AM    Glucose  08/30/2021 08:50 AM    Microalb/Creat ratio (ug/mg creat.) 4.1 11/05/2014 08:22 AM    LDL, calculated 121 (H) 08/10/2021 08:25 AM    LDL, calculated 126 (H) 07/01/2020 07:52 AM    Creatinine 0.90 08/10/2021 08:25 AM       He has diabetes and hypertension. Brooklyn Mcrae has the additional concern of feels well    Reports taking blood pressure medications without side affects. No complaints of exertional chest pain, excessive shortness of breath or focal weakness. Minimal swelling in lower legs or dizziness with standing. Diet and Lifestyle: follows a diabetic diet regularly, nonsmoker. Patient Active Problem List    Diagnosis Date Noted    Hyperglycemia due to type 2 diabetes mellitus ([facility] 08/13/2021    Screening for colon cancer 08/13/2021    Conductive hearing loss 06/24/2020    Mild major depression, single episode ([facility] 06/06/2019    Hypertension 06/28/2012    Atopic dermatitis 06/28/2012    Erectile dysfunction 06/28/2012    Dyslipidemia 06/28/2012     Current Outpatient Medications   Medication Sig Dispense Refill    Blood-Glucose Meter monitoring kit Whichever Ins will cover 1 Kit 0    glucose blood VI test strips (blood glucose test) strip Daily as needed 50 Strip 1    losartan-hydroCHLOROthiazide (HYZAAR) 50-12.5 mg per tablet Take 1 Tablet by mouth daily. 90 Tablet 1    sertraline (ZOLOFT) 50 mg tablet TAKE 1 TABLET BY MOUTH EVERY DAY 90 Tablet 3    mometasone (ELOCON) 0.1 % ointment Apply thin layer topically to affected areas 45 g 5    empagliflozin-metFORMIN (SYNJARDY) 5-500 mg per tablet Take 1 Tablet by mouth two (2) times daily (with meals).  60 Tablet 5    sildenafil citrate (VIAGRA) 50 mg tablet TAKE 1 TABLET BY MOUTH AS NEEDED 5 Tablet 5    fluticasone (FLONASE) 50 mcg/actuation nasal spray 2 Sprays by Both Nostrils route daily. 1 Bottle 5     No Known Allergies  Past Medical History:   Diagnosis Date    Depression     ED (erectile dysfunction)     Hypertension      Social History     Tobacco Use    Smoking status: Never Smoker    Smokeless tobacco: Never Used   Substance Use Topics    Alcohol use: Yes     Alcohol/week: 1.0 standard drinks     Types: 1 Glasses of wine per week             Review of Systems  Pertinent items are noted in HPI. Objective:     Significant for the following: NAD  Visit Vitals  /83 (BP 1 Location: Left arm, BP Patient Position: Sitting, BP Cuff Size: Adult)   Pulse 68   Temp 98.5 °F (36.9 °C) (Temporal)   Resp 16   Ht 5' 9\" (1.753 m)   Wt 180 lb (81.6 kg)   SpO2 98%   BMI 26.58 kg/m²     WD WN male NAD      Lab review: labs reviewed, I note that glycosylated hemoglobin mildly abnormal but acceptable, good. Assessment/Plan:     Follow-up diabetes stable. Diabetic issues reviewed with him: all medications, side effects and compliance discussed carefully, glycohemoglobin and other lab monitoring discussed and labs immediately prior to next visit. ICD-10-CM ICD-9-CM    1. Encounter for occupational physical examination  Z02.89 V70.5 AMB POC URINALYSIS DIP STICK AUTO W/O MICRO      AMB POC GLUCOSE BLOOD, BY GLUCOSE MONITORING DEVICE   2. Type 2 diabetes mellitus with hyperglycemia, without long-term current use of insulin ([facility])  E11.65 250.00 MICROALBUMIN, UR, RAND W/ MICROALB/CREAT RATIO     790.29        Orders Placed This Encounter    MICROALBUMIN, UR, RAND W/ MICROALB/CREAT RATIO     Standing Status:   Future     Standing Expiration Date:   8/30/2022    AMB POC URINALYSIS DIP STICK AUTO W/O MICRO    AMB POC GLUCOSE BLOOD, BY GLUCOSE MONITORING DEVICE       Follow-up and Dispositions    · Return in about 2 months (around 10/30/2021). Chronic Conditions Addressed Today     1. Hypertension    2.  Hyperglycemia due to type 2 diabetes mellitus ([facility]     Relevant Orders     MICROALBUMIN, UR, RAND W/ MICROALB/CREAT RATIO      Acute Diagnoses Addressed Today     Encounter for occupational physical examination    -  Primary        Relevant Orders        AMB POC URINALYSIS DIP STICK AUTO W/O MICRO (Completed)        AMB POC GLUCOSE BLOOD, BY GLUCOSE MONITORING DEVICE (Completed)          F/up as scheduled

## 2021-08-31 LAB
CREAT UR-MCNC: 158 MG/DL
MICROALBUMIN UR-MCNC: 1.8 MG/DL
MICROALBUMIN/CREAT UR-RTO: 11 MG/G (ref 0–30)

## 2021-09-12 PROBLEM — Z12.11 SCREENING FOR COLON CANCER: Status: RESOLVED | Noted: 2021-08-13 | Resolved: 2021-09-12

## 2021-10-12 ENCOUNTER — DOCUMENTATION ONLY (OUTPATIENT)
Dept: INTERNAL MEDICINE CLINIC | Age: 51
End: 2021-10-12

## 2021-10-12 NOTE — PROGRESS NOTES
10/12/21 Key: [id number] - PA Case ID: [id number] - Rx #: [id number]  Status  Sent to Plan today  Drug  Sildenafil Citrate 50MG tablets  Form  [employer] [place] [employer] [place] Form ([id number] [employer]CP[employer])  Original Claim Info  [address] CALL [phone]. DRUG REQUIRES PRIOR AUTHORIZATION,please follow up as needed

## 2021-10-13 DIAGNOSIS — E11.65 TYPE 2 DIABETES MELLITUS WITH HYPERGLYCEMIA, WITHOUT LONG-TERM CURRENT USE OF INSULIN (HCC): ICD-10-CM

## 2021-10-13 RX ORDER — SILDENAFIL 50 MG/1
TABLET, FILM COATED ORAL
Qty: 10 TABLET | Refills: 5 | Status: SHIPPED | OUTPATIENT
Start: 2021-10-13

## 2021-11-11 LAB
BUN SERPL-MCNC: 12 MG/DL (ref 6–24)
BUN/CREAT SERPL: 12 (ref 9–20)
CALCIUM SERPL-MCNC: 9.6 MG/DL (ref 8.7–10.2)
CHLORIDE SERPL-SCNC: 103 MMOL/L (ref 96–106)
CO2 SERPL-SCNC: 24 MMOL/L (ref 20–29)
CREAT SERPL-MCNC: 1 MG/DL (ref 0.76–1.27)
EST. AVERAGE GLUCOSE BLD GHB EST-MCNC: 126 MG/DL
GLUCOSE SERPL-MCNC: 118 MG/DL (ref 65–99)
HBA1C MFR BLD: 6 % (ref 4.8–5.6)
POTASSIUM SERPL-SCNC: 4.3 MMOL/L (ref 3.5–5.2)
SODIUM SERPL-SCNC: 139 MMOL/L (ref 134–144)

## 2021-11-24 ENCOUNTER — OFFICE VISIT (OUTPATIENT)
Dept: INTERNAL MEDICINE CLINIC | Age: 51
End: 2021-11-24
Payer: COMMERCIAL

## 2021-11-24 VITALS
DIASTOLIC BLOOD PRESSURE: 83 MMHG | WEIGHT: 181 LBS | SYSTOLIC BLOOD PRESSURE: 134 MMHG | HEART RATE: 67 BPM | OXYGEN SATURATION: 98 % | BODY MASS INDEX: 26.81 KG/M2 | TEMPERATURE: 98.4 F | RESPIRATION RATE: 16 BRPM | HEIGHT: 69 IN

## 2021-11-24 DIAGNOSIS — Z11.59 ENCOUNTER FOR HEPATITIS C SCREENING TEST FOR LOW RISK PATIENT: ICD-10-CM

## 2021-11-24 DIAGNOSIS — I10 HYPERTENSION, UNSPECIFIED TYPE: ICD-10-CM

## 2021-11-24 DIAGNOSIS — E11.65 TYPE 2 DIABETES MELLITUS WITH HYPERGLYCEMIA, WITHOUT LONG-TERM CURRENT USE OF INSULIN (HCC): Primary | ICD-10-CM

## 2021-11-24 DIAGNOSIS — E78.2 MIXED HYPERLIPIDEMIA: ICD-10-CM

## 2021-11-24 PROCEDURE — 99214 OFFICE O/P EST MOD 30 MIN: CPT | Performed by: FAMILY MEDICINE

## 2021-11-24 NOTE — PROGRESS NOTES
Subjective:     Lizette Duarte is a 46 y.o. male seen for follow-up of diabetes. He has had hypoglycemic attacks. .no  Blood sugar control has been much better    Lab Results   Component Value Date/Time    Hemoglobin A1c 6.0 (H) 11/10/2021 08:24 AM    Hemoglobin A1c 12.5 (H) 08/10/2021 08:25 AM    Hemoglobin A1c 5.8 (H) 01/08/2018 08:59 AM    Glucose 118 (H) 11/10/2021 08:24 AM    Glucose  08/30/2021 08:50 AM    Microalbumin/Creat ratio (mg/g creat) 11 08/30/2021 02:56 PM    Microalbumin,urine random 1.80 08/30/2021 02:56 PM    LDL, calculated 121 (H) 08/10/2021 08:25 AM    LDL, calculated 126 (H) 07/01/2020 07:52 AM    Creatinine 1.00 11/10/2021 08:24 AM       He has diabetes, hypertension and hyperlipidemia. Lizette Duarte has the additional concern of dec urine no c/o    Reports taking blood pressure medications without side affects. No complaints of exertional chest pain, excessive shortness of breath or focal weakness. Minimal swelling in lower legs or dizziness with standing. Diet and Lifestyle: follows a diabetic diet regularly. Patient Active Problem List    Diagnosis Date Noted    Mixed hyperlipidemia 11/24/2021    Hyperglycemia due to type 2 diabetes mellitus ([facility] 08/13/2021    Conductive hearing loss 06/24/2020    Mild major depression, single episode ([facility] 06/06/2019    Hypertension 06/28/2012    Atopic dermatitis 06/28/2012    Erectile dysfunction 06/28/2012    Dyslipidemia 06/28/2012     Current Outpatient Medications   Medication Sig Dispense Refill    empagliflozin-metFORMIN (SYNJARDY) 5-500 mg per tablet Take 1 Tablet by mouth two (2) times daily (with meals).  180 Tablet 1    sildenafil citrate (VIAGRA) 50 mg tablet TAKE 1 TABLET BY MOUTH AS NEEDED 10 Tablet 5    Blood-Glucose Meter monitoring kit Whichever Ins will cover 1 Kit 0    glucose blood VI test strips (blood glucose test) strip Daily as needed 50 Strip 1    losartan-hydroCHLOROthiazide (HYZAAR) 50-12.5 mg per tablet Take 1 Tablet by mouth daily. 90 Tablet 1    sertraline (ZOLOFT) 50 mg tablet TAKE 1 TABLET BY MOUTH EVERY DAY 90 Tablet 3    mometasone (ELOCON) 0.1 % ointment Apply thin layer topically to affected areas 45 g 5    fluticasone (FLONASE) 50 mcg/actuation nasal spray 2 Sprays by Both Nostrils route daily. 1 Bottle 5     No Known Allergies  Past Medical History:   Diagnosis Date    Depression     ED (erectile dysfunction)     Hypertension     Mixed hyperlipidemia 11/24/2021     Social History     Tobacco Use    Smoking status: Never Smoker    Smokeless tobacco: Never Used   Substance Use Topics    Alcohol use: Yes     Alcohol/week: 1.0 standard drink     Types: 1 Glasses of wine per week        Lab Results   Component Value Date/Time    Cholesterol, total 183 08/10/2021 08:25 AM    HDL Cholesterol 40 08/10/2021 08:25 AM    LDL, calculated 121 (H) 08/10/2021 08:25 AM    LDL, calculated 126 (H) 07/01/2020 07:52 AM    Triglyceride 119 08/10/2021 08:25 AM    CHOL/HDL Ratio 4.5 11/11/2010 12:19 PM        Review of Systems  Pertinent items are noted in HPI. Objective:     Significant for the following: WNL  Visit Vitals  /83 (BP 1 Location: Left arm, BP Patient Position: Sitting, BP Cuff Size: Adult)   Pulse 67   Temp 98.4 °F (36.9 °C) (Temporal)   Resp 16   Ht 5' 9\" (1.753 m)   Wt 181 lb (82.1 kg)   SpO2 98%   BMI 26.73 kg/m²     WD WN male NAD      Lab review: labs reviewed, I note that glycosylated hemoglobin mildly abnormal but acceptable. Assessment/Plan:     Follow-up diabetes well controlled, stable, improved. Diabetic issues reviewed with him: all medications, side effects and compliance discussed carefully, annual eye examinations at Ophthalmology discussed and glycohemoglobin and other lab monitoring discussed. Chronic Conditions Addressed Today     1. Mixed hyperlipidemia    2. Hypertension     Relevant Orders     METABOLIC PANEL, BASIC    3.  Hyperglycemia due to type 2 diabetes mellitus (HCC) - Primary     Relevant Orders     AMB SUPPLY ORDER     HEMOGLOBIN A1C WITH EAG      Acute Diagnoses Addressed Today     Encounter for hepatitis C screening test for low risk patient            Relevant Orders        HEP B SURFACE AG        RPR        HEPATITIS C AB, RFLX TO QT BY PCR        HIV 1/2 AG/AB, 4TH GENERATION,W RFLX CONFIRM          Follow-up and Dispositions    · Return in about 3 months (around 2/24/2022) for routine follow up.

## 2021-11-24 NOTE — PROGRESS NOTES
Chief Complaint   Patient presents with   [facility]       Patient has not been out of the country in (14 months), NO diarrhea, NO cough, NO chest conjestion, NO temp. Pt has not been around anyone with these symptoms. Health Maintenance reviewed. I have reviewed the patient's medical history in detail and updated the computerized patient record. 1. Have you been to the ER, urgent care clinic since your last visit? No  Hospitalized since your last visit?  no    2. Have you seen or consulted any other health care providers outside of the [address] [place] [address] [place] since your last visit? No  Include any pap smears or colon screening. Encouraged pt to discuss pt's wishes with spouse/partner/family and bring them in the next appt to follow thru with the Advanced Directive    @  [address], last 12 mths 7/8/2020   Able to walk? Yes   Fall in past 12 months? No       3 most recent PHQ Screens 8/30/2021   Little interest or pleasure in doing things Several days   Feeling down, depressed, irritable, or hopeless Several days   Total Score PHQ 2 2       Abuse Screening Questionnaire 8/30/2021   Do you ever feel afraid of your partner? N   Are you in a relationship with someone who physically or mentally threatens you? N   Is it safe for you to go home?  Y       ADL Assessment 8/30/2021   Feeding yourself No Help Needed   Getting from bed to chair No Help Needed   Getting dressed No Help Needed   Bathing or showering No Help Needed   Walk across the room (includes cane/walker) No Help Needed   Using the telphone No Help Needed   Taking your medications No Help Needed   Preparing meals No Help Needed   Managing money (expenses/bills) No Help Needed   Moderately strenuous housework (laundry) No Help Needed   Shopping for personal items (toiletries/medicines) No Help Needed   Shopping for groceries No Help Needed   Driving No Help Needed   Climbing a flight of stairs No Help Needed   Getting to places beyond walking distances No Help Needed

## 2021-12-23 DIAGNOSIS — E11.65 TYPE 2 DIABETES MELLITUS WITH HYPERGLYCEMIA, WITHOUT LONG-TERM CURRENT USE OF INSULIN (HCC): ICD-10-CM

## 2021-12-23 RX ORDER — BLOOD SUGAR DIAGNOSTIC
STRIP MISCELLANEOUS
Qty: 50 STRIP | Refills: 1 | Status: SHIPPED | OUTPATIENT
Start: 2021-12-23 | End: 2022-03-29

## 2022-03-12 DIAGNOSIS — E11.65 TYPE 2 DIABETES MELLITUS WITH HYPERGLYCEMIA, WITHOUT LONG-TERM CURRENT USE OF INSULIN (HCC): ICD-10-CM

## 2022-03-12 DIAGNOSIS — Z11.59 ENCOUNTER FOR HEPATITIS C SCREENING TEST FOR LOW RISK PATIENT: Primary | ICD-10-CM

## 2022-03-12 DIAGNOSIS — I10 HYPERTENSION, UNSPECIFIED TYPE: ICD-10-CM

## 2022-03-12 DIAGNOSIS — E78.2 MIXED HYPERLIPIDEMIA: ICD-10-CM

## 2022-03-18 PROBLEM — H90.2 CONDUCTIVE HEARING LOSS: Status: ACTIVE | Noted: 2020-06-24

## 2022-03-18 PROBLEM — F32.0 MILD MAJOR DEPRESSION, SINGLE EPISODE (HCC): Status: ACTIVE | Noted: 2019-06-06

## 2022-03-18 LAB
BUN SERPL-MCNC: 13 MG/DL (ref 6–24)
BUN/CREAT SERPL: 14 (ref 9–20)
CALCIUM SERPL-MCNC: 9.7 MG/DL (ref 8.7–10.2)
CHLORIDE SERPL-SCNC: 102 MMOL/L (ref 96–106)
CHOLEST SERPL-MCNC: 184 MG/DL (ref 100–199)
CO2 SERPL-SCNC: 22 MMOL/L (ref 20–29)
CREAT SERPL-MCNC: 0.94 MG/DL (ref 0.76–1.27)
EGFR: 98 ML/MIN/1.73
EST. AVERAGE GLUCOSE BLD GHB EST-MCNC: 128 MG/DL
GLUCOSE SERPL-MCNC: 125 MG/DL (ref 65–99)
HBA1C MFR BLD: 6.1 % (ref 4.8–5.6)
HBV SURFACE AG SERPL QL IA: NEGATIVE
HCV AB S/CO SERPL IA: <0.1 S/CO RATIO (ref 0–0.9)
HCV AB SERPL QL IA: NORMAL
HDLC SERPL-MCNC: 48 MG/DL
HIV 1+2 AB+HIV1 P24 AG SERPL QL IA: NON REACTIVE
IMP & REVIEW OF LAB RESULTS: NORMAL
INTERPRETATION: NORMAL
LDLC SERPL CALC-MCNC: 122 MG/DL (ref 0–99)
POTASSIUM SERPL-SCNC: 4.3 MMOL/L (ref 3.5–5.2)
RPR SER QL: NON REACTIVE
SODIUM SERPL-SCNC: 139 MMOL/L (ref 134–144)
TRIGL SERPL-MCNC: 73 MG/DL (ref 0–149)
VLDLC SERPL CALC-MCNC: 14 MG/DL (ref 5–40)

## 2022-03-19 PROBLEM — E11.65 HYPERGLYCEMIA DUE TO TYPE 2 DIABETES MELLITUS (HCC): Status: ACTIVE | Noted: 2021-08-13

## 2022-03-19 PROBLEM — E78.2 MIXED HYPERLIPIDEMIA: Status: ACTIVE | Noted: 2021-11-24

## 2022-03-22 ENCOUNTER — OFFICE VISIT (OUTPATIENT)
Dept: INTERNAL MEDICINE CLINIC | Age: 52
End: 2022-03-22
Payer: COMMERCIAL

## 2022-03-22 VITALS
TEMPERATURE: 98.4 F | RESPIRATION RATE: 16 BRPM | WEIGHT: 190 LBS | SYSTOLIC BLOOD PRESSURE: 108 MMHG | BODY MASS INDEX: 28.14 KG/M2 | HEIGHT: 69 IN | OXYGEN SATURATION: 98 % | HEART RATE: 61 BPM | DIASTOLIC BLOOD PRESSURE: 63 MMHG

## 2022-03-22 DIAGNOSIS — E78.2 MIXED HYPERLIPIDEMIA: ICD-10-CM

## 2022-03-22 DIAGNOSIS — F32.0 MILD MAJOR DEPRESSION, SINGLE EPISODE (HCC): ICD-10-CM

## 2022-03-22 DIAGNOSIS — I10 HYPERTENSION, UNSPECIFIED TYPE: ICD-10-CM

## 2022-03-22 DIAGNOSIS — E11.65 TYPE 2 DIABETES MELLITUS WITH HYPERGLYCEMIA, WITHOUT LONG-TERM CURRENT USE OF INSULIN (HCC): Primary | ICD-10-CM

## 2022-03-22 DIAGNOSIS — Z02.89 ENCOUNTER FOR OCCUPATIONAL PHYSICAL EXAMINATION: ICD-10-CM

## 2022-03-22 PROCEDURE — 99214 OFFICE O/P EST MOD 30 MIN: CPT | Performed by: FAMILY MEDICINE

## 2022-03-22 RX ORDER — LOSARTAN POTASSIUM AND HYDROCHLOROTHIAZIDE 12.5; 5 MG/1; MG/1
1 TABLET ORAL DAILY
Qty: 90 TABLET | Refills: 3 | Status: SHIPPED | OUTPATIENT
Start: 2022-03-22

## 2022-03-22 RX ORDER — SERTRALINE HYDROCHLORIDE 50 MG/1
TABLET, FILM COATED ORAL
Qty: 90 TABLET | Refills: 3 | Status: SHIPPED | OUTPATIENT
Start: 2022-03-22

## 2022-03-22 NOTE — PATIENT INSTRUCTIONS
Learning About Low-Fat Eating  What is low-fat eating? Most food has some fat in it. Your body needs some fat to be healthy. But some kinds of fats are healthier than others. In a low-fat eating plan, you try to choose healthier fats and eat fewer unhealthy fats. Healthy fats include olive and canola oil. Try to avoid eating too much saturated fat, such as in cheese and meats. You do not need to cut all fat from your diet. But you can make healthier choices about the types and amount of fat you eat. Even though it is a good idea to choose healthier fats, it is still important to be careful of how much fat you eat, because all fats are high in calories. What are the different types of fats? Unhealthy fat  · Saturated fat. Saturated fats are mostly in animal foods, such as meat and dairy foods. Tropical oils, such as coconut oil, palm oil, and cocoa butter, are also saturated fats. Healthy fats  · Monounsaturated fat. Monounsaturated fats are liquid at room temperature but get solid when refrigerated. Eating foods that are high in this fat may help lower your \"bad\" (LDL) cholesterol, keep your \"good\" (HDL) cholesterol level up, and lower your chances of getting coronary artery disease. This fat is found in canola oil, olive oil, peanut oil, olives, avocados, nuts, and nut butters. · Polyunsaturated fat. Polyunsaturated fats are liquid at room temperature. They are in safflower, sunflower, and corn oils. They are also the main fat in seafood. Omega-3 fatty acids are types of polyunsaturated fat. Eating fish may lower your chances of getting coronary artery disease. Fatty fish such as salmon and mackerel contain these healthy fatty acids. So do ground flaxseeds and flaxseed oil, soybeans, walnuts, and seeds. Why cut down on unhealthy fats? Eating foods that contain saturated fats can raise the LDL (\"bad\") cholesterol in your blood.  Having a high level of LDL cholesterol increases your chance of hardening of the arteries (atherosclerosis), which can lead to heart disease, heart attack, and stroke. In general:  · No more than 10% of your daily calories should come from saturated fat. This is about 20 grams in a 2,000-calorie diet. · No more than 10% of your daily calories should come from polyunsaturated fat. This is about 20 grams in a 2,000-calorie diet. · Monounsaturated fats can be up to 15% of your daily calories. This is about 25 to 30 grams in a 2,000-calorie diet. If you're not sure how much fat you should be eating or how many calories you need each day to stay at a healthy weight, talk to a registered dietitian. A dietitian can help you create a plan that's right for you. What can you do to cut down on fat? Foods like cheese, butter, sausage, and desserts can have a lot of unhealthy fats. Try these tips for healthier meals at home and when you eat out. At home  · Fill up on fruits, vegetables, and whole grains. · Think of meat as a side dish instead of as the main part of your meal.  · When you do eat meat, make it extra-lean ground beef (97% lean), ground turkey breast (without skin added), meats with fat trimmed off before cooking, or skinless chicken. · Try main dishes that use whole wheat pasta, brown rice, dried beans, or vegetables. · Use cooking methods that use little or no fat, such as broiling, steaming, or grilling. Use cooking spray instead of oil. If you use oil, use a monounsaturated oil, such as canola or olive oil. · Read food labels on canned, bottled, or packaged foods. Choose those with little saturated fat. When eating out at a restaurant  · Order foods that are broiled or poached instead of fried or breaded. · Cut back on the amount of butter or margarine that you use on bread. Use small amounts of olive oil instead. · Order sauces, gravies, and salad dressings on the side, and use only a little.   · When you order pasta, choose tomato sauce instead of cream sauce.  · Ask for salsa with your baked potato instead of sour cream, butter, cheese, or [place]. Where can you learn more? Go to http://www.gray.com/  Enter [id number] in the search box to learn more about \"Learning About Low-Fat Eating. \"  Current as of: September 8, 2021               Content Version: 13.2  © 8010-2513 [employer]. Care instructions adapted under license by [facility] EHR (which disclaims liability or warranty for this information). If you have questions about a medical condition or this instruction, always ask your healthcare professional. [place] [address] any warranty or liability for your use of this information.

## 2022-03-22 NOTE — PROGRESS NOTES
Subjective:     Aline Lyles is a 46 y.o. male seen for follow-up of diabetes. He has had hypoglycemic attacks. .no  Blood sugar control has been good    Lab Results   Component Value Date/Time    Hemoglobin A1c 6.1 (H) 03/17/2022 08:51 AM    Hemoglobin A1c 6.0 (H) 11/10/2021 08:24 AM    Hemoglobin A1c 12.5 (H) 08/10/2021 08:25 AM    Glucose 125 (H) 03/17/2022 08:51 AM    Glucose  08/30/2021 08:50 AM    Microalbumin/Creat ratio (mg/g creat) 11 08/30/2021 02:56 PM    Microalbumin,urine random 1.80 08/30/2021 02:56 PM    LDL, calculated 122 (H) 03/17/2022 08:51 AM    LDL, calculated 126 (H) 07/01/2020 07:52 AM    Creatinine 0.94 03/17/2022 08:51 AM       He has diabetes, hypertension and hyperlipidemia. Aline Lyles has the additional concern of labs, we discussed mildly inc chol and DM, no c/o doesn't want more meds    Reports taking blood pressure medications without side affects. No complaints of exertional chest pain, excessive shortness of breath or focal weakness. Minimal swelling in lower legs or dizziness with standing. Diet and Lifestyle: follows a diabetic diet regularly, nonsmoker. Patient Active Problem List    Diagnosis Date Noted    Mixed hyperlipidemia 11/24/2021    Hyperglycemia due to type 2 diabetes mellitus ([facility] 08/13/2021    Conductive hearing loss 06/24/2020    Mild major depression, single episode ([facility] 06/06/2019    Hypertension 06/28/2012    Atopic dermatitis 06/28/2012    Erectile dysfunction 06/28/2012     Current Outpatient Medications   Medication Sig Dispense Refill    sertraline (ZOLOFT) 50 mg tablet TAKE 1 TABLET BY MOUTH EVERY DAY 90 Tablet 3    losartan-hydroCHLOROthiazide (HYZAAR) 50-12.5 mg per tablet Take 1 Tablet by mouth daily. 90 Tablet 3    empagliflozin-metFORMIN (SYNJARDY) 5-500 mg per tablet Take 1 Tablet by mouth two (2) times daily (with meals).  180 Tablet 3    glucose blood VI test strips (OneTouch Ultra Test) strip USE AS DIRECTED EVERY DAY AS NEEDED 50 Strip 1    sildenafil citrate (VIAGRA) 50 mg tablet TAKE 1 TABLET BY MOUTH AS NEEDED 10 Tablet 5    Blood-Glucose Meter monitoring kit Whichever Ins will cover 1 Kit 0    mometasone (ELOCON) 0.1 % ointment Apply thin layer topically to affected areas 45 g 5    fluticasone (FLONASE) 50 mcg/actuation nasal spray 2 Sprays by Both Nostrils route daily. 1 Bottle 5     No Known Allergies  Past Medical History:   Diagnosis Date    Depression     ED (erectile dysfunction)     Hypertension     Mixed hyperlipidemia 11/24/2021     Social History     Tobacco Use    Smoking status: Never Smoker    Smokeless tobacco: Never Used   Substance Use Topics    Alcohol use: Yes     Alcohol/week: 1.0 standard drink     Types: 1 Glasses of wine per week        Lab Results   Component Value Date/Time    Cholesterol, total 184 03/17/2022 08:51 AM    HDL Cholesterol 48 03/17/2022 08:51 AM    LDL, calculated 122 (H) 03/17/2022 08:51 AM    LDL, calculated 126 (H) 07/01/2020 07:52 AM    Triglyceride 73 03/17/2022 08:51 AM    CHOL/HDL Ratio 4.5 11/11/2010 12:19 PM        Review of Systems  Pertinent items are noted in HPI. Objective:     Significant for the following: wnl  Visit Vitals  /63 (BP 1 Location: Left arm, BP Patient Position: Sitting, BP Cuff Size: Adult)   Pulse 61   Temp 98.4 °F (36.9 °C) (Temporal)   Resp 16   Ht 5' 9\" (1.753 m)   Wt 190 lb (86.2 kg)   SpO2 98%   BMI 28.06 kg/m²     WD WN male NAD    Lab review: labs reviewed, I note that lipids LDL result does not yet meet goal.    Assessment/Plan:     Follow-up diabetes well controlled, stable. Diabetic issues reviewed with him: all medications, side effects and compliance discussed carefully, glycohemoglobin and other lab monitoring discussed and labs immediately prior to next visit. ICD-10-CM ICD-9-CM    1. Type 2 diabetes mellitus with hyperglycemia, without long-term current use of insulin (HCC)  E11.65 250.00      790.29    2. Mixed hyperlipidemia  E78.2 272.2    3. Hypertension, unspecified type  I10 401.9      Orders Placed This Encounter    METABOLIC PANEL, BASIC     Standing Status:   Future     Standing Expiration Date:   9/22/2022    HEMOGLOBIN A1C WITH EAG     Standing Status:   Future     Standing Expiration Date:   3/22/2023    LIPID PANEL     Standing Status:   Future     Standing Expiration Date:   9/22/2022    sertraline (ZOLOFT) 50 mg tablet     Sig: TAKE 1 TABLET BY MOUTH EVERY DAY     Dispense:  90 Tablet     Refill:  3    losartan-hydroCHLOROthiazide (HYZAAR) 50-12.5 mg per tablet     Sig: Take 1 Tablet by mouth daily. Dispense:  90 Tablet     Refill:  3    empagliflozin-metFORMIN (SYNJARDY) 5-500 mg per tablet     Sig: Take 1 Tablet by mouth two (2) times daily (with meals). Dispense:  180 Tablet     Refill:  3     Suggested putting him on statin medicines given the risk of stroke and heart attack although his diabetes is well controlled. Does not want to start additional medicine at this time. We can give him 6 months to try diet. Chronic Conditions Addressed Today     1. Mixed hyperlipidemia     Relevant Orders     LIPID PANEL    2. Mild major depression, single episode (HCC)     Relevant Medications     sertraline (ZOLOFT) 50 mg tablet    3. Hypertension     Relevant Medications     losartan-hydroCHLOROthiazide (HYZAAR) 50-12.5 mg per tablet     Other Relevant Orders     METABOLIC PANEL, BASIC    4.  Hyperglycemia due to type 2 diabetes mellitus (HCC) - Primary     Relevant Medications     losartan-hydroCHLOROthiazide (HYZAAR) 50-12.5 mg per tablet     empagliflozin-metFORMIN (SYNJARDY) 5-500 mg per tablet     Other Relevant Orders     HEMOGLOBIN A1C WITH EAG      Acute Diagnoses Addressed Today     Encounter for occupational physical examination            Relevant Medications        losartan-hydroCHLOROthiazide (HYZAAR) 50-12.5 mg per tablet        Follow-up 6 months

## 2022-04-19 ENCOUNTER — DOCUMENTATION ONLY (OUTPATIENT)
Dept: INTERNAL MEDICINE CLINIC | Age: 52
End: 2022-04-19

## 2022-04-19 NOTE — PROGRESS NOTES
4/19/22 Key: HARMONY ALCALA Case ID: [id number]. PA submitted electronic for Synjardy 5-500 mg tablets. Approval : 4/19/2022 thru 4/18/2025,as long, remain covered by prescription drug plan and there are no changes in benefit.

## 2022-09-16 LAB
BUN SERPL-MCNC: 20 MG/DL (ref 6–24)
BUN/CREAT SERPL: 23 (ref 9–20)
CALCIUM SERPL-MCNC: 10 MG/DL (ref 8.7–10.2)
CHLORIDE SERPL-SCNC: 100 MMOL/L (ref 96–106)
CHOLEST SERPL-MCNC: 182 MG/DL (ref 100–199)
CO2 SERPL-SCNC: 23 MMOL/L (ref 20–29)
CREAT SERPL-MCNC: 0.88 MG/DL (ref 0.76–1.27)
EGFR: 103 ML/MIN/1.73
GLUCOSE SERPL-MCNC: 105 MG/DL (ref 65–99)
HBA1C MFR BLD: 6.5 % (ref 4.8–5.6)
HDLC SERPL-MCNC: 48 MG/DL
IMP & REVIEW OF LAB RESULTS: NORMAL
LDLC SERPL CALC-MCNC: 116 MG/DL (ref 0–99)
POTASSIUM SERPL-SCNC: 4.3 MMOL/L (ref 3.5–5.2)
SODIUM SERPL-SCNC: 137 MMOL/L (ref 134–144)
TRIGL SERPL-MCNC: 96 MG/DL (ref 0–149)
VLDLC SERPL CALC-MCNC: 18 MG/DL (ref 5–40)

## 2022-09-20 ENCOUNTER — OFFICE VISIT (OUTPATIENT)
Dept: INTERNAL MEDICINE CLINIC | Age: 52
End: 2022-09-20
Payer: COMMERCIAL

## 2022-09-20 VITALS
HEART RATE: 68 BPM | OXYGEN SATURATION: 98 % | TEMPERATURE: 98.5 F | BODY MASS INDEX: 28.58 KG/M2 | DIASTOLIC BLOOD PRESSURE: 63 MMHG | RESPIRATION RATE: 16 BRPM | WEIGHT: 193 LBS | HEIGHT: 69 IN | SYSTOLIC BLOOD PRESSURE: 106 MMHG

## 2022-09-20 DIAGNOSIS — E11.65 TYPE 2 DIABETES MELLITUS WITH HYPERGLYCEMIA, WITHOUT LONG-TERM CURRENT USE OF INSULIN (HCC): Primary | ICD-10-CM

## 2022-09-20 DIAGNOSIS — I10 HYPERTENSION, UNSPECIFIED TYPE: ICD-10-CM

## 2022-09-20 DIAGNOSIS — E78.2 MIXED HYPERLIPIDEMIA: ICD-10-CM

## 2022-09-20 DIAGNOSIS — R21 RASH: ICD-10-CM

## 2022-09-20 PROCEDURE — 99214 OFFICE O/P EST MOD 30 MIN: CPT | Performed by: FAMILY MEDICINE

## 2022-09-20 RX ORDER — MOMETASONE FUROATE 1 MG/G
OINTMENT TOPICAL
Qty: 45 G | Refills: 5 | Status: SHIPPED | OUTPATIENT
Start: 2022-09-20

## 2022-09-20 RX ORDER — ATORVASTATIN CALCIUM 10 MG/1
10 TABLET, FILM COATED ORAL DAILY
Qty: 30 TABLET | Refills: 5 | Status: SHIPPED | OUTPATIENT
Start: 2022-09-20

## 2022-09-20 NOTE — PROGRESS NOTES
Chief Complaint   Patient presents with    Diabetes     Patient has not been out of the country in (14 months), NO diarrhea, NO cough, NO chest conjestion, NO temp. Pt has not been around anyone with these symptoms. Health Maintenance reviewed. I have reviewed the patient's medical history in detail and updated the computerized patient record. 1. Have you been to the ER, urgent care clinic since your last visit? No  Hospitalized since your last visit?  no    2. Have you seen or consulted any other health care providers outside of the [address] [place] [address] since your last visit? No Include any pap smears or colon screening. Encouraged pt to discuss pt's wishes with spouse/partner/family and bring them in the next appt to follow thru with the Advanced Directive    @  [address], last 12 mths 7/8/2020   Able to walk? Yes   Fall in past 12 months? No       3 most recent PHQ Screens 3/22/2022   Little interest or pleasure in doing things Several days   Feeling down, depressed, irritable, or hopeless Several days   Total Score PHQ 2 2       Abuse Screening Questionnaire 8/30/2021   Do you ever feel afraid of your partner? N   Are you in a relationship with someone who physically or mentally threatens you? N   Is it safe for you to go home?  Y       ADL Assessment 8/30/2021   Feeding yourself No Help Needed   Getting from bed to chair No Help Needed   Getting dressed No Help Needed   Bathing or showering No Help Needed   Walk across the room (includes cane/walker) No Help Needed   Using the telphone No Help Needed   Taking your medications No Help Needed   Preparing meals No Help Needed   Managing money (expenses/bills) No Help Needed   Moderately strenuous housework (laundry) No Help Needed   Shopping for personal items (toiletries/medicines) No Help Needed   Shopping for groceries No Help Needed   Driving No Help Needed   Climbing a flight of stairs No Help Needed   Getting to places beyond walking distances No Help Needed

## 2022-09-20 NOTE — PROGRESS NOTES
Subjective:     Aguila Gibbons is a 46 y.o. male seen for follow-up of diabetes. He has had hypoglycemic attacks. .no  Blood sugar control has been good    Lab Results   Component Value Date/Time    Hemoglobin A1c 6.5 (H) 09/15/2022 08:04 AM    Hemoglobin A1c 6.1 (H) 03/17/2022 08:51 AM    Hemoglobin A1c 6.0 (H) 11/10/2021 08:24 AM    Glucose 105 (H) 09/15/2022 08:04 AM    Glucose  08/30/2021 08:50 AM    Microalbumin/Creat ratio (mg/g creat) 11 08/30/2021 02:56 PM    Microalbumin,urine random 1.80 08/30/2021 02:56 PM    LDL, calculated 116 (H) 09/15/2022 08:04 AM    LDL, calculated 126 (H) 07/01/2020 07:52 AM    Creatinine 0.88 09/15/2022 08:04 AM       He has diabetes, hypertension, and hyperlipidemia. Aguila Gibbons has the additional concern of feels well    Reports taking blood pressure medications without side affects. No complaints of exertional chest pain, excessive shortness of breath or focal weakness. Minimal swelling in lower legs or dizziness with standing. Diet and Lifestyle: follows a diabetic diet regularly, nonsmoker. Patient Active Problem List    Diagnosis Date Noted    Mixed hyperlipidemia 11/24/2021    Hyperglycemia due to type 2 diabetes mellitus ([facility] 08/13/2021    Conductive hearing loss 06/24/2020    Mild major depression, single episode ([facility] 06/06/2019    Hypertension 06/28/2012    Atopic dermatitis 06/28/2012    Erectile dysfunction 06/28/2012     Current Outpatient Medications   Medication Sig Dispense Refill    OneTouch Ultra Test strip USE AS DIRECTED EVERY DAY AS NEEDED 100 Strip 5    sertraline (ZOLOFT) 50 mg tablet TAKE 1 TABLET BY MOUTH EVERY DAY 90 Tablet 3    losartan-hydroCHLOROthiazide (HYZAAR) 50-12.5 mg per tablet Take 1 Tablet by mouth daily. 90 Tablet 3    empagliflozin-metFORMIN (SYNJARDY) 5-500 mg per tablet Take 1 Tablet by mouth two (2) times daily (with meals).  180 Tablet 3    sildenafil citrate (VIAGRA) 50 mg tablet TAKE 1 TABLET BY MOUTH AS NEEDED 10 Tablet 5    Blood-Glucose Meter monitoring kit Whichever Ins will cover 1 Kit 0    mometasone (ELOCON) 0.1 % ointment Apply thin layer topically to affected areas 45 g 5    fluticasone (FLONASE) 50 mcg/actuation nasal spray 2 Sprays by Both Nostrils route daily. 1 Bottle 5     No Known Allergies  Past Medical History:   Diagnosis Date    Depression     ED (erectile dysfunction)     Hypertension     Mixed hyperlipidemia 11/24/2021     Social History     Tobacco Use    Smoking status: Never    Smokeless tobacco: Never   Substance Use Topics    Alcohol use: Yes     Alcohol/week: 1.0 standard drink     Types: 1 Glasses of wine per week             Review of Systems  Pertinent items are noted in HPI. Objective:     Significant for the following: WNL  Visit Vitals  /63 (BP 1 Location: Left arm, BP Patient Position: Sitting, BP Cuff Size: Adult)   Pulse 68   Temp 98.5 °F (36.9 °C) (Temporal)   Resp 16   Ht 5' 9\" (1.753 m)   Wt 193 lb (87.5 kg)   SpO2 98%   BMI 28.50 kg/m²     WD WN male NAD  Heart RRR without murmers clicks or rubs  Lungs CTA  Abdo soft nontender  Ext no edema  Diabetic foot exam was performed. No obvious sores or red lesions. Sensation checked by monofilament exam which was normal.  Dorsalis pedis pulse was+. Lab review: labs reviewed, I note that glycosylated hemoglobin mildly abnormal but acceptable. Assessment/Plan:     Follow-up diabetes well controlled, stable. Diabetic issues reviewed with him: all medications, side effects and compliance discussed carefully and glycohemoglobin and other lab monitoring discussed. ICD-10-CM ICD-9-CM    1. Type 2 diabetes mellitus with hyperglycemia, without long-term current use of insulin (HCC)  E11.65 250.00 MICROALBUMIN, UR, RAND W/ MICROALB/CREAT RATIO     790.29       2. Hypertension, unspecified type  I10 401.9       3.  Mixed hyperlipidemia  E78.2 272.2 atorvastatin (LIPITOR) 10 mg tablet      METABOLIC PANEL, COMPREHENSIVE CK      4. Rash  R21 782.1 mometasone (ELOCON) 0.1 % ointment          Orders Placed This Encounter    MICROALBUMIN, UR, RAND W/ MICROALB/CREAT RATIO     Standing Status:   Future     Standing Expiration Date:   2/14/6378    METABOLIC PANEL, COMPREHENSIVE     Standing Status:   Future     Standing Expiration Date:   3/23/2023    CK     Standing Status:   Future     Standing Expiration Date:   3/20/2023    mometasone (ELOCON) 0.1 % ointment     Sig: Apply thin layer topically to affected areas     Dispense:  45 g     Refill:  5    atorvastatin (LIPITOR) 10 mg tablet     Sig: Take 1 Tablet by mouth daily. Dispense:  30 Tablet     Refill:  5       Start statin  Discussed possible side affects, precautions, and drug interactions and possible benefits of the medication(s). Chronic Conditions Addressed Today       1. Mixed hyperlipidemia     Relevant Medications     atorvastatin (LIPITOR) 10 mg tablet     Other Relevant Orders     METABOLIC PANEL, COMPREHENSIVE     CK    2. Hyperglycemia due to type 2 diabetes mellitus ([facility] - Primary     Relevant Medications     atorvastatin (LIPITOR) 10 mg tablet     Other Relevant Orders     MICROALBUMIN, UR, RAND W/ MICROALB/CREAT RATIO    3.  Hypertension     Relevant Medications     atorvastatin (LIPITOR) 10 mg tablet     Acute Diagnoses Addressed Today       Rash            Relevant Medications        mometasone (ELOCON) 0.1 % ointment            3 to 4-month follow-up

## 2022-09-21 LAB
CREAT UR-MCNC: 89 MG/DL
MICROALBUMIN UR-MCNC: 0.71 MG/DL
MICROALBUMIN/CREAT UR-RTO: 8 MG/G (ref 0–30)

## 2022-10-19 LAB
ALBUMIN SERPL-MCNC: 4.7 G/DL (ref 3.8–4.9)
ALBUMIN/GLOB SERPL: 1.6 {RATIO} (ref 1.2–2.2)
ALP SERPL-CCNC: 96 IU/L (ref 44–121)
ALT SERPL-CCNC: 46 IU/L (ref 0–44)
AST SERPL-CCNC: 28 IU/L (ref 0–40)
BILIRUB SERPL-MCNC: 0.7 MG/DL (ref 0–1.2)
BUN SERPL-MCNC: 12 MG/DL (ref 6–24)
BUN/CREAT SERPL: 13 (ref 9–20)
CALCIUM SERPL-MCNC: 9.7 MG/DL (ref 8.7–10.2)
CHLORIDE SERPL-SCNC: 102 MMOL/L (ref 96–106)
CK SERPL-CCNC: 194 U/L (ref 41–331)
CO2 SERPL-SCNC: 20 MMOL/L (ref 20–29)
CREAT SERPL-MCNC: 0.93 MG/DL (ref 0.76–1.27)
EGFR: 99 ML/MIN/1.73
GLOBULIN SER CALC-MCNC: 3 G/DL (ref 1.5–4.5)
GLUCOSE SERPL-MCNC: 131 MG/DL (ref 70–99)
POTASSIUM SERPL-SCNC: 4.2 MMOL/L (ref 3.5–5.2)
PROT SERPL-MCNC: 7.7 G/DL (ref 6–8.5)
SODIUM SERPL-SCNC: 140 MMOL/L (ref 134–144)

## 2022-12-12 RX ORDER — SILDENAFIL 50 MG/1
TABLET, FILM COATED ORAL
Qty: 10 TABLET | Refills: 5 | Status: SHIPPED | OUTPATIENT
Start: 2022-12-12

## 2023-03-22 DIAGNOSIS — E11.65 TYPE 2 DIABETES MELLITUS WITH HYPERGLYCEMIA, WITHOUT LONG-TERM CURRENT USE OF INSULIN (HCC): Primary | ICD-10-CM

## 2023-03-22 DIAGNOSIS — E78.2 MIXED HYPERLIPIDEMIA: ICD-10-CM

## 2023-03-22 DIAGNOSIS — I10 HYPERTENSION, UNSPECIFIED TYPE: ICD-10-CM

## 2023-04-04 ENCOUNTER — OFFICE VISIT (OUTPATIENT)
Dept: INTERNAL MEDICINE CLINIC | Age: 53
End: 2023-04-04
Payer: COMMERCIAL

## 2023-04-04 PROCEDURE — 99214 OFFICE O/P EST MOD 30 MIN: CPT | Performed by: FAMILY MEDICINE

## 2023-04-04 RX ORDER — SERTRALINE HYDROCHLORIDE 25 MG/1
25 TABLET, FILM COATED ORAL DAILY
Qty: 90 TABLET | Refills: 1 | Status: SHIPPED
Start: 2023-04-04

## 2023-04-04 RX ORDER — LOSARTAN POTASSIUM AND HYDROCHLOROTHIAZIDE 12.5; 5 MG/1; MG/1
1 TABLET ORAL DAILY
Qty: 90 TABLET | Refills: 3 | Status: SHIPPED
Start: 2023-04-04

## 2023-04-04 NOTE — PROGRESS NOTES
Subjective:     Geovanna Hutson is a 46 y.o. male seen for follow-up of diabetes. He has had hypoglycemic attacks. .no  Blood sugar control has been good    Lab Results   Component Value Date/Time    Hemoglobin A1c 6.5 (H) 09/15/2022 08:04 AM    Hemoglobin A1c 6.1 (H) 03/17/2022 08:51 AM    Hemoglobin A1c 6.0 (H) 11/10/2021 08:24 AM    Glucose 131 (H) 10/18/2022 08:26 AM    Glucose  08/30/2021 08:50 AM    Microalbumin/Creat ratio (mg/g creat) 8 09/20/2022 12:00 PM    Microalbumin,urine random 0.71 09/20/2022 12:00 PM    LDL, calculated 116 (H) 09/15/2022 08:04 AM    LDL, calculated 126 (H) 07/01/2020 07:52 AM    Creatinine 0.93 10/18/2022 08:26 AM       He has diabetes, hypertension, and hyperlipidemia. Geovanna Hutson has the additional concern of mood up and down stolpped zoloft. Feels well in general    Reports taking blood pressure medications without side affects. No complaints of exertional chest pain, excessive shortness of breath or focal weakness. Minimal swelling in lower legs or dizziness with standing. Diet and Lifestyle: nonsmoker. Patient Active Problem List    Diagnosis Date Noted    Mixed hyperlipidemia 11/24/2021    Hyperglycemia due to type 2 diabetes mellitus ([facility] [address] 08/13/2021    Conductive hearing loss 06/24/2020    Mild major depression, single episode ([facility] 06/06/2019    Hypertension 06/28/2012    Atopic dermatitis 06/28/2012    Erectile dysfunction 06/28/2012     No Known Allergies  Past Medical History:   Diagnosis Date    Depression     ED (erectile dysfunction)     Hypertension     Mixed hyperlipidemia 11/24/2021     Social History     Tobacco Use    Smoking status: Never    Smokeless tobacco: Never   Substance Use Topics    Alcohol use: Yes     Alcohol/week: 1.0 standard drink     Types: 1 Glasses of wine per week             Review of Systems  Pertinent items are noted in HPI.     Objective:     Significant for the following:     Visit Vitals  BP (!) 140/90   Pulse 74 Temp 98 °F (36.7 °C) (Temporal)   Resp 20   Ht 5' 9\" (1.753 m)   Wt 189 lb (85.7 kg)   SpO2 98%   BMI 27.91 kg/m²         WD WN male NAD        Assessment/Plan:     Follow-up diabetes well controlled, stable. Diabetic issues reviewed with him: all medications, side effects and compliance discussed carefully, glycohemoglobin and other lab monitoring discussed, and labs immediately prior to next visit. ICD-10-CM ICD-9-CM    1. Type 2 diabetes mellitus with hyperglycemia, without long-term current use of insulin ([facility])  E11.65 250.00 empagliflozin-metFORMIN (SYNJARDY) 5-500 mg per tablet     790.29 AMB SUPPLY ORDER      HEMOGLOBIN A1C WITH EAG      2. Hypertension, unspecified type  I10 401.9 losartan-hydroCHLOROthiazide (HYZAAR) 50-12.5 mg per tablet      METABOLIC PANEL, BASIC      3. Mixed hyperlipidemia  E78.2 272.2       4. Mild major depression, single episode (formerly [facility])  F32.0 296.21       5. Anxiety  F41.9 300.00 sertraline (ZOLOFT) 25 mg tablet      6.  Encounter for occupational physical examination  Z02.89 V70.5         Diabetes doing reasonably well continue current treatment  Depression we discussed whether he wanted to start his antidepressant again agrees to do so at a lower dosage  Blood pressure is little borderline we will just recheck upon follow-up    4 months

## 2023-04-04 NOTE — PROGRESS NOTES
Chief Complaint   Patient presents with    Labs     Patient had labs drawn yesterday am, results not posted yet

## 2023-04-05 LAB
ALBUMIN/CREAT UR: 16 MG/G CREAT (ref 0–29)
BUN SERPL-MCNC: 21 MG/DL (ref 6–24)
BUN/CREAT SERPL: 22 (ref 9–20)
CALCIUM SERPL-MCNC: 9.3 MG/DL (ref 8.7–10.2)
CHLORIDE SERPL-SCNC: 103 MMOL/L (ref 96–106)
CHOLEST SERPL-MCNC: 173 MG/DL (ref 100–199)
CO2 SERPL-SCNC: 20 MMOL/L (ref 20–29)
CREAT SERPL-MCNC: 0.95 MG/DL (ref 0.76–1.27)
CREAT UR-MCNC: 154.8 MG/DL
EGFRCR SERPLBLD CKD-EPI 2021: 96 ML/MIN/1.73
EST. AVERAGE GLUCOSE BLD GHB EST-MCNC: 143 MG/DL
GLUCOSE SERPL-MCNC: 106 MG/DL (ref 70–99)
HBA1C MFR BLD: 6.6 % (ref 4.8–5.6)
HDLC SERPL-MCNC: 48 MG/DL
IMP & REVIEW OF LAB RESULTS: NORMAL
LDLC SERPL CALC-MCNC: 106 MG/DL (ref 0–99)
MICROALBUMIN UR-MCNC: 24 UG/ML
POTASSIUM SERPL-SCNC: 3.9 MMOL/L (ref 3.5–5.2)
SODIUM SERPL-SCNC: 139 MMOL/L (ref 134–144)
TRIGL SERPL-MCNC: 103 MG/DL (ref 0–149)
VLDLC SERPL CALC-MCNC: 19 MG/DL (ref 5–40)

## 2023-08-01 ENCOUNTER — OFFICE VISIT (OUTPATIENT)
Facility: CLINIC | Age: 53
End: 2023-08-01
Payer: COMMERCIAL

## 2023-08-01 VITALS
OXYGEN SATURATION: 98 % | RESPIRATION RATE: 16 BRPM | HEIGHT: 69 IN | WEIGHT: 188 LBS | DIASTOLIC BLOOD PRESSURE: 75 MMHG | SYSTOLIC BLOOD PRESSURE: 132 MMHG | HEART RATE: 67 BPM | BODY MASS INDEX: 27.85 KG/M2 | TEMPERATURE: 98.6 F

## 2023-08-01 DIAGNOSIS — E11.65 TYPE 2 DIABETES MELLITUS WITH HYPERGLYCEMIA, WITHOUT LONG-TERM CURRENT USE OF INSULIN (HCC): ICD-10-CM

## 2023-08-01 DIAGNOSIS — I10 ESSENTIAL (PRIMARY) HYPERTENSION: ICD-10-CM

## 2023-08-01 DIAGNOSIS — L73.8 SEBACEOUS GLAND HYPERPLASIA OF FACE: Primary | ICD-10-CM

## 2023-08-01 DIAGNOSIS — E78.2 MIXED HYPERLIPIDEMIA: ICD-10-CM

## 2023-08-01 DIAGNOSIS — F32.0 MILD MAJOR DEPRESSION, SINGLE EPISODE (HCC): ICD-10-CM

## 2023-08-01 PROCEDURE — 99214 OFFICE O/P EST MOD 30 MIN: CPT | Performed by: FAMILY MEDICINE

## 2023-08-01 PROCEDURE — 3044F HG A1C LEVEL LT 7.0%: CPT | Performed by: FAMILY MEDICINE

## 2023-08-01 PROCEDURE — 3078F DIAST BP <80 MM HG: CPT | Performed by: FAMILY MEDICINE

## 2023-08-01 PROCEDURE — 3074F SYST BP LT 130 MM HG: CPT | Performed by: FAMILY MEDICINE

## 2023-08-01 RX ORDER — ATORVASTATIN CALCIUM 10 MG/1
10 TABLET, FILM COATED ORAL DAILY
Qty: 90 TABLET | Refills: 1 | Status: SHIPPED | OUTPATIENT
Start: 2023-08-01

## 2023-08-01 RX ORDER — LOSARTAN POTASSIUM AND HYDROCHLOROTHIAZIDE 12.5; 5 MG/1; MG/1
1 TABLET ORAL DAILY
Qty: 90 TABLET | Refills: 1 | Status: SHIPPED | OUTPATIENT
Start: 2023-08-01

## 2023-08-01 SDOH — ECONOMIC STABILITY: FOOD INSECURITY: WITHIN THE PAST 12 MONTHS, THE FOOD YOU BOUGHT JUST DIDN'T LAST AND YOU DIDN'T HAVE MONEY TO GET MORE.: NEVER TRUE

## 2023-08-01 SDOH — ECONOMIC STABILITY: TRANSPORTATION INSECURITY
IN THE PAST 12 MONTHS, HAS LACK OF TRANSPORTATION KEPT YOU FROM MEETINGS, WORK, OR FROM GETTING THINGS NEEDED FOR DAILY LIVING?: NO

## 2023-08-01 SDOH — ECONOMIC STABILITY: INCOME INSECURITY: HOW HARD IS IT FOR YOU TO PAY FOR THE VERY BASICS LIKE FOOD, HOUSING, MEDICAL CARE, AND HEATING?: NOT HARD AT ALL

## 2023-08-01 SDOH — ECONOMIC STABILITY: FOOD INSECURITY: WITHIN THE PAST 12 MONTHS, YOU WORRIED THAT YOUR FOOD WOULD RUN OUT BEFORE YOU GOT MONEY TO BUY MORE.: NEVER TRUE

## 2023-08-01 SDOH — ECONOMIC STABILITY: HOUSING INSECURITY
IN THE LAST 12 MONTHS, WAS THERE A TIME WHEN YOU DID NOT HAVE A STEADY PLACE TO SLEEP OR SLEPT IN A SHELTER (INCLUDING NOW)?: NO

## 2023-08-01 ASSESSMENT — PATIENT HEALTH QUESTIONNAIRE - PHQ9
SUM OF ALL RESPONSES TO PHQ QUESTIONS 1-9: 0
1. LITTLE INTEREST OR PLEASURE IN DOING THINGS: 0
2. FEELING DOWN, DEPRESSED OR HOPELESS: 0
SUM OF ALL RESPONSES TO PHQ9 QUESTIONS 1 & 2: 0

## 2023-08-01 NOTE — PROGRESS NOTES
Subjective:     Hope Matos is a 48 y.o. male seen for follow-up of diabetes. He has had hypoglycemic attacks. .no  Blood sugar control has been ok  Here with his spouse, concerned about a nodule in his neck. Hemoglobin A1C   Date Value Ref Range Status   04/03/2023 6.6 (H) 4.8 - 5.6 % Final     Comment:              Prediabetes: 5.7 - 6.4           Diabetes: >6.4           Glycemic control for adults with diabetes: <7.0       Lab Results   Component Value Date/Time     04/03/2023 08:29 AM    K 3.9 04/03/2023 08:29 AM     04/03/2023 08:29 AM    CO2 20 04/03/2023 08:29 AM    BUN 21 04/03/2023 08:29 AM    CREATININE 0.95 04/03/2023 08:29 AM    GLUCOSE 106 04/03/2023 08:29 AM    CALCIUM 9.3 04/03/2023 08:29 AM    LABGLOM 96 04/03/2023 08:29 AM          He has diabetes, hypertension, and hyperlipidemia. Hope Matos has the additional concern of lump back of head a  mo, been to [facility] needle Rx and AB, has come down some but still there    Reports taking blood pressure medications without side affects. No complaints of exertional chest pain, excessive shortness of breath or focal weakness. Minimal swelling in lower legs or dizziness with standing. Diet and Lifestyle:  Non-smoker .   In general follows low carbohydrate minimal sweet food intake diet    Patient Active Problem List    Diagnosis Date Noted    Mixed hyperlipidemia 11/24/2021    Hyperglycemia due to type 2 diabetes mellitus ([facility] [address] [facility] [address]) 08/13/2021    Conductive hearing loss 06/24/2020    Mild major depression, single episode ([facility] [address] [facility]) 06/06/2019    Erectile dysfunction 06/28/2012    Atopic dermatitis 06/28/2012    Hypertension 06/28/2012     Current Outpatient Medications   Medication Sig Dispense Refill    sertraline (ZOLOFT) 50 MG tablet Take 1 tablet by mouth daily 90 tablet 1    losartan-hydroCHLOROthiazide (HYZAAR) 50-12.5 MG per tablet Take 1 tablet by mouth daily 90 tablet 1    Empagliflozin-metFORMIN HCl 5-500 MG TABS Take 1 tablet by

## 2023-08-01 NOTE — PROGRESS NOTES
Chief Complaint   Patient presents with    Mass     Bump/mass to lower back of head - sensitive   ER - July 9th took antib and bump went down       Patient has not been out of the country in (14 months), NO diarrhea, NO cough, NO chest conjestion, NO temp. Pt has not been around anyone with these symptoms. Health Maintenance reviewed. I have reviewed the patient's medical history in detail and updated the computerized patient record. 1. \"Have you been to the ER, urgent care clinic since your last visit? Hospitalized since your last visit? \" yes    2. \"Have you seen or consulted any other health care providers outside of the [address] [place] [address] since your last visit? \" no     3. For patients aged 43-73: Has the patient had a colonoscopy / FIT/ Cologuard?  no

## 2023-09-25 DIAGNOSIS — F41.9 ANXIETY DISORDER, UNSPECIFIED: ICD-10-CM

## 2023-09-26 RX ORDER — SERTRALINE HYDROCHLORIDE 25 MG/1
25 TABLET, FILM COATED ORAL DAILY
Qty: 90 TABLET | Refills: 1 | Status: SHIPPED | OUTPATIENT
Start: 2023-09-26

## 2023-10-05 LAB
BUN SERPL-MCNC: 14 MG/DL (ref 6–24)
BUN/CREAT SERPL: 16 (ref 9–20)
CALCIUM SERPL-MCNC: 9.4 MG/DL (ref 8.7–10.2)
CHLORIDE SERPL-SCNC: 104 MMOL/L (ref 96–106)
CO2 SERPL-SCNC: 21 MMOL/L (ref 20–29)
CREAT SERPL-MCNC: 0.88 MG/DL (ref 0.76–1.27)
EGFRCR SERPLBLD CKD-EPI 2021: 103 ML/MIN/1.73
GLUCOSE SERPL-MCNC: 175 MG/DL (ref 70–99)
HBA1C MFR BLD: 6.3 % (ref 4.8–5.6)
POTASSIUM SERPL-SCNC: 4.2 MMOL/L (ref 3.5–5.2)
SODIUM SERPL-SCNC: 141 MMOL/L (ref 134–144)

## 2023-10-11 ENCOUNTER — OFFICE VISIT (OUTPATIENT)
Facility: CLINIC | Age: 53
End: 2023-10-11
Payer: COMMERCIAL

## 2023-10-11 VITALS
SYSTOLIC BLOOD PRESSURE: 125 MMHG | TEMPERATURE: 98.6 F | BODY MASS INDEX: 27.99 KG/M2 | HEART RATE: 74 BPM | OXYGEN SATURATION: 98 % | RESPIRATION RATE: 16 BRPM | WEIGHT: 189 LBS | HEIGHT: 69 IN | DIASTOLIC BLOOD PRESSURE: 66 MMHG

## 2023-10-11 DIAGNOSIS — F32.0 MILD MAJOR DEPRESSION, SINGLE EPISODE (HCC): ICD-10-CM

## 2023-10-11 DIAGNOSIS — I10 ESSENTIAL (PRIMARY) HYPERTENSION: ICD-10-CM

## 2023-10-11 DIAGNOSIS — E11.65 TYPE 2 DIABETES MELLITUS WITH HYPERGLYCEMIA, WITHOUT LONG-TERM CURRENT USE OF INSULIN (HCC): Primary | ICD-10-CM

## 2023-10-11 DIAGNOSIS — E78.2 MIXED HYPERLIPIDEMIA: ICD-10-CM

## 2023-10-11 PROCEDURE — 99214 OFFICE O/P EST MOD 30 MIN: CPT | Performed by: FAMILY MEDICINE

## 2023-10-11 PROCEDURE — 3074F SYST BP LT 130 MM HG: CPT | Performed by: FAMILY MEDICINE

## 2023-10-11 PROCEDURE — 3078F DIAST BP <80 MM HG: CPT | Performed by: FAMILY MEDICINE

## 2023-10-11 PROCEDURE — 3044F HG A1C LEVEL LT 7.0%: CPT | Performed by: FAMILY MEDICINE

## 2023-10-11 RX ORDER — SILDENAFIL 100 MG/1
100 TABLET, FILM COATED ORAL DAILY PRN
Qty: 15 TABLET | Refills: 5 | Status: SHIPPED | OUTPATIENT
Start: 2023-10-11

## 2023-10-11 RX ORDER — ATORVASTATIN CALCIUM 10 MG/1
10 TABLET, FILM COATED ORAL DAILY
Qty: 90 TABLET | Refills: 3 | Status: SHIPPED | OUTPATIENT
Start: 2023-10-11

## 2023-10-11 RX ORDER — LOSARTAN POTASSIUM AND HYDROCHLOROTHIAZIDE 12.5; 5 MG/1; MG/1
1 TABLET ORAL DAILY
Qty: 90 TABLET | Refills: 3 | Status: SHIPPED | OUTPATIENT
Start: 2023-10-11

## 2023-10-11 RX ORDER — SERTRALINE HYDROCHLORIDE 25 MG/1
25 TABLET, FILM COATED ORAL DAILY
Qty: 90 TABLET | Refills: 3 | Status: SHIPPED | OUTPATIENT
Start: 2023-10-11